# Patient Record
Sex: FEMALE | Race: WHITE | NOT HISPANIC OR LATINO | Employment: FULL TIME | URBAN - METROPOLITAN AREA
[De-identification: names, ages, dates, MRNs, and addresses within clinical notes are randomized per-mention and may not be internally consistent; named-entity substitution may affect disease eponyms.]

---
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
## 2019-02-13 RX ORDER — ALLOPURINOL 100 MG/1
100 TABLET ORAL DAILY
Qty: 30 TABLET | Refills: 0 | Status: SHIPPED | OUTPATIENT
Start: 2019-02-13 | End: 2019-08-06 | Stop reason: SDUPTHER

## 2019-04-04 RX ORDER — ERGOCALCIFEROL 1.25 MG/1
CAPSULE ORAL
Qty: 24 CAPSULE | Refills: 0 | Status: SHIPPED | OUTPATIENT
Start: 2019-04-04 | End: 2019-07-28 | Stop reason: SDUPTHER

## 2019-04-08 RX ORDER — LEVOTHYROXINE SODIUM 0.07 MG/1
75 TABLET ORAL DAILY
Qty: 30 TABLET | Refills: 0 | Status: SHIPPED | OUTPATIENT
Start: 2019-04-08 | End: 2019-08-06

## 2019-04-17 ENCOUNTER — OFFICE VISIT (OUTPATIENT)
Dept: OBSTETRICS AND GYNECOLOGY | Age: 19
End: 2019-04-17

## 2019-04-17 VITALS
SYSTOLIC BLOOD PRESSURE: 108 MMHG | WEIGHT: 258.8 LBS | BODY MASS INDEX: 44.18 KG/M2 | DIASTOLIC BLOOD PRESSURE: 70 MMHG | HEIGHT: 64 IN

## 2019-04-17 DIAGNOSIS — B37.31 CANDIDA VAGINITIS: ICD-10-CM

## 2019-04-17 DIAGNOSIS — Z13.9 SPECIAL SCREENING: Primary | ICD-10-CM

## 2019-04-17 PROCEDURE — 99213 OFFICE O/P EST LOW 20 MIN: CPT | Performed by: NURSE PRACTITIONER

## 2019-04-17 PROCEDURE — 81025 URINE PREGNANCY TEST: CPT | Performed by: NURSE PRACTITIONER

## 2019-04-17 RX ORDER — FLUCONAZOLE 150 MG/1
150 TABLET ORAL ONCE
Qty: 2 TABLET | Refills: 0 | Status: SHIPPED | OUTPATIENT
Start: 2019-04-17 | End: 2019-04-17

## 2019-04-17 RX ORDER — NAPROXEN 500 MG/1
TABLET ORAL
Refills: 0 | COMMUNITY
Start: 2019-01-09 | End: 2021-05-17

## 2019-04-17 RX ORDER — DOXYCYCLINE 100 MG/1
CAPSULE ORAL
Refills: 0 | COMMUNITY
Start: 2019-04-06 | End: 2020-02-07

## 2019-04-17 NOTE — PROGRESS NOTES
[facility]  Routine Annual Visit    2019    Patient: Flora Solis          MR#:[id number]      History of Present Illness    19 y.o. female  who presents with complaint of vaginal itching and discharge since starting an antibiotic for ear infection. Flora reports she has one more day of the antibiotic therapy. She denies new partners or exposure to STI. Getting  in July. Weight loss noted end of last year and pt commended- weight steady for the past 3 months- encouraged continued weight loss.     Patient's last menstrual period was 2019 (approximate).  Obstetric History:  OB History      Para Term  AB Living    1       1      SAB TAB Ectopic Molar Multiple Live Births                        Menstrual History:     Patient's last menstrual period was 2019 (approximate).       Sexual History:       ________________________________________  Patient Active Problem List   Diagnosis   • Irregular menses   • Vitamin D deficiency   • Morbid obesity (CMS/HCC)   • Acanthosis nigricans   • PCOS (polycystic ovarian syndrome)   • Hyperprolactinemia (CMS/HCC)   • Hyperuricemia   • Acquired hypothyroidism   • Abdominal cramping   • History of miscarriage   • Missed period   • Prediabetes       Past Medical History:   Diagnosis Date   • Hypothyroidism    • Migraine    • Polycystic ovary syndrome    • Vitamin D deficiency        Past Surgical History:   Procedure Laterality Date   • INNER EAR SURGERY     • WISDOM TOOTH EXTRACTION         Social History     Tobacco Use   Smoking Status Never Smoker   Smokeless Tobacco Never Used       Family History   Problem Relation Age of Onset   • Asthma Father    • Hypertension Mother    • Diabetes Mother    • Hypertension Maternal Grandmother    • Hypertension Maternal Grandfather        Prior to Admission medications    Medication Sig Start Date End Date Taking? Authorizing Provider   allopurinol (ZYLOPRIM) 100 MG tablet TAKE 1  "TABLET BY MOUTH DAILY 2/13/19 2/13/20 Yes Nancy Urena APRN   desogestrel-ethinyl estradiol (DESOGEN) 0.15-30 MG-MCG per tablet Take 1 tablet by mouth Daily. 9/20/18 9/20/19 Yes Radha Carrera APRN   doxycycline (MONODOX) 100 MG capsule TK 1 C PO BID FOR 10 DAYS 4/6/19  Yes Nay Botello MD   fexofenadine (ALLEGRA) 180 MG tablet Take 180 mg by mouth. 9/14/18  Yes ProviderNay MD   levothyroxine (SYNTHROID, LEVOTHROID) 75 MCG tablet TAKE 1 TABLET BY MOUTH DAILY 4/8/19 4/7/20 Yes Nancy Urena APRN   metFORMIN (GLUCOPHAGE) 500 MG tablet TAKE 1 TABLET BY MOUTH TWICE DAILY WITH MEALS 3/7/19  Yes Nancy Urena APRN   naproxen (NAPROSYN) 500 MG tablet TK 1 T PO BID PRF PAIN 1/9/19  Yes ProviderNay MD   RaNITidine HCl (ZANTAC PO) Take 1 tablet by mouth 2 (Two) Times a Day.   Yes Provider, MD Nay   vitamin D (ERGOCALCIFEROL) 51928 units capsule capsule TAKE 1 CAPSULE BY MOUTH 2 TIMES EVERY WEEK 4/4/19  Yes Nancy Urena APRN   allopurinol (ZYLOPRIM) 100 MG tablet Take 1 tablet by mouth Daily. 12/27/18 12/27/19  Nancy Urena APRN     ________________________________________    The following portions of the patient's history were reviewed and updated as appropriate: allergies, current medications, past family history, past medical history, past social history, past surgical history and problem list.    Review of Systems   Constitutional: Negative for chills, fatigue and fever.   Gastrointestinal: Negative for abdominal distention and abdominal pain.   Genitourinary: Positive for vaginal discharge. Negative for decreased urine volume, difficulty urinating, dyspareunia, dysuria, enuresis, flank pain, frequency, genital sores, hematuria, menstrual problem, pelvic pain, urgency, vaginal bleeding and vaginal pain.       Objective   Physical Exam    /70   Ht 162.6 cm (64\")   Wt 117 kg (258 lb 12.8 oz)   LMP 04/09/2019 (Approximate)   Breastfeeding? No   BMI 44.42 kg/m²  " "  BP Readings from Last 3 Encounters:   04/17/19 108/70   12/27/18 126/80   10/29/18 116/64      Wt Readings from Last 3 Encounters:   04/17/19 117 kg (258 lb 12.8 oz)   12/27/18 117 kg (259 lb) (>99 %, Z= 2.59)*   10/29/18 123 kg (272 lb) (>99 %, Z= 2.67)*     * Growth percentiles are based on [facility] (Girls, 2-20 Years) data.        BMI: Estimated body mass index is 44.42 kg/m² as calculated from the following:    Height as of this encounter: 162.6 cm (64\").    Weight as of this encounter: 117 kg (258 lb 12.8 oz).         General:   alert, appears stated age, cooperative and no distress                   Vulva: normal   Vagina: normal mucosa, curdlike discharge   Cervix: no cervical motion tenderness and no lesions   Uterus: normal size, mobile, non-tender or normal shape and consistency   Adnexa: exam limited by habitus     Assessment:    1. Candida - exam and history consistent with yeast- diflucan sent to pharm while awaiting culture    Plan:    []  Rx:   []  Mammogram request made  []  PAP done  []  Occult fecal blood test ([facility])  []  Labs:   []  GC/Chl/TV  []  DEXA scan   []  Referral for colonoscopy:           ANDREW Haas  4/17/2019 3:23 PM  "

## 2019-04-21 LAB
A VAGINAE DNA VAG QL NAA+PROBE: NORMAL SCORE
BVAB2 DNA VAG QL NAA+PROBE: NORMAL SCORE
C ALBICANS DNA VAG QL NAA+PROBE: NEGATIVE
C GLABRATA DNA VAG QL NAA+PROBE: NEGATIVE
C TRACH RRNA SPEC QL NAA+PROBE: NEGATIVE
MEGA1 DNA VAG QL NAA+PROBE: NORMAL SCORE
N GONORRHOEA RRNA SPEC QL NAA+PROBE: NEGATIVE
T VAGINALIS RRNA SPEC QL NAA+PROBE: NEGATIVE

## 2019-04-22 ENCOUNTER — TELEPHONE (OUTPATIENT)
Dept: OBSTETRICS AND GYNECOLOGY | Age: 19
End: 2019-04-22

## 2019-04-22 NOTE — TELEPHONE ENCOUNTER
----- Message from ANDREW Pierce sent at 4/22/2019  8:38 AM EDT -----  Please inform patient her swab returned totally negative for infection

## 2019-07-22 DIAGNOSIS — E79.0 HYPERURICEMIA: ICD-10-CM

## 2019-07-22 DIAGNOSIS — R73.03 PREDIABETES: ICD-10-CM

## 2019-07-22 DIAGNOSIS — E03.9 ACQUIRED HYPOTHYROIDISM: ICD-10-CM

## 2019-07-22 DIAGNOSIS — E22.1 HYPERPROLACTINEMIA (HCC): ICD-10-CM

## 2019-07-22 DIAGNOSIS — E28.2 PCOS (POLYCYSTIC OVARIAN SYNDROME): ICD-10-CM

## 2019-07-22 DIAGNOSIS — E55.9 VITAMIN D DEFICIENCY: Primary | ICD-10-CM

## 2019-07-23 ENCOUNTER — LAB (OUTPATIENT)
Dept: ENDOCRINOLOGY | Age: 19
End: 2019-07-23

## 2019-07-23 DIAGNOSIS — E79.0 HYPERURICEMIA: ICD-10-CM

## 2019-07-23 DIAGNOSIS — E55.9 VITAMIN D DEFICIENCY: ICD-10-CM

## 2019-07-23 DIAGNOSIS — E03.9 ACQUIRED HYPOTHYROIDISM: ICD-10-CM

## 2019-07-23 DIAGNOSIS — E28.2 PCOS (POLYCYSTIC OVARIAN SYNDROME): ICD-10-CM

## 2019-07-23 DIAGNOSIS — E22.1 HYPERPROLACTINEMIA (HCC): ICD-10-CM

## 2019-07-23 DIAGNOSIS — R73.03 PREDIABETES: ICD-10-CM

## 2019-07-29 LAB
25(OH)D3+25(OH)D2 SERPL-MCNC: 48.9 NG/ML (ref 30–100)
ALBUMIN SERPL-MCNC: 4.3 G/DL (ref 3.5–5.2)
ALBUMIN/GLOB SERPL: 1.9 G/DL
ALP SERPL-CCNC: 58 U/L (ref 39–117)
ALT SERPL-CCNC: 10 U/L (ref 1–33)
AST SERPL-CCNC: 9 U/L (ref 1–32)
BILIRUB SERPL-MCNC: <0.2 MG/DL (ref 0.2–1.2)
BUN SERPL-MCNC: 12 MG/DL (ref 6–20)
BUN/CREAT SERPL: 18.5 (ref 7–25)
C PEPTIDE SERPL-MCNC: 6.2 NG/ML (ref 1.1–4.4)
CALCIUM SERPL-MCNC: 9.5 MG/DL (ref 8.6–10.5)
CHLORIDE SERPL-SCNC: 104 MMOL/L (ref 98–107)
CHOLEST SERPL-MCNC: 176 MG/DL (ref 0–200)
CO2 SERPL-SCNC: 27.5 MMOL/L (ref 22–29)
CREAT SERPL-MCNC: 0.65 MG/DL (ref 0.57–1)
GLOBULIN SER CALC-MCNC: 2.3 GM/DL
GLUCOSE SERPL-MCNC: 88 MG/DL (ref 65–99)
HBA1C MFR BLD: 5.4 % (ref 4.8–5.6)
HDLC SERPL-MCNC: 42 MG/DL (ref 40–60)
INTERPRETATION: NORMAL
LDLC SERPL CALC-MCNC: 105 MG/DL (ref 0–100)
Lab: NORMAL
POTASSIUM SERPL-SCNC: 4.7 MMOL/L (ref 3.5–5.2)
PROLACTIN SERPL-MCNC: 13.9 NG/ML (ref 4.8–23.3)
PROT SERPL-MCNC: 6.6 G/DL (ref 6–8.5)
SODIUM SERPL-SCNC: 143 MMOL/L (ref 136–145)
T3FREE SERPL-MCNC: 3.1 PG/ML (ref 2–4.4)
T4 FREE SERPL-MCNC: 1.29 NG/DL (ref 0.93–1.7)
T4 SERPL-MCNC: 8.32 MCG/DL (ref 4.5–11.7)
THYROGLOB AB SERPL-ACNC: 1.3 IU/ML
THYROGLOB SERPL-MCNC: 6.2 NG/ML
THYROGLOB SERPL-MCNC: ABNORMAL NG/ML
TRIGL SERPL-MCNC: 147 MG/DL (ref 0–150)
TSH SERPL DL<=0.005 MIU/L-ACNC: 3.28 MIU/ML (ref 0.27–4.2)
URATE SERPL-MCNC: 5.3 MG/DL (ref 2.4–5.7)
VLDLC SERPL CALC-MCNC: 29.4 MG/DL

## 2019-07-29 RX ORDER — ERGOCALCIFEROL 1.25 MG/1
CAPSULE ORAL
Qty: 24 CAPSULE | Refills: 0 | Status: SHIPPED | OUTPATIENT
Start: 2019-07-29 | End: 2019-08-06 | Stop reason: SDUPTHER

## 2019-08-06 ENCOUNTER — OFFICE VISIT (OUTPATIENT)
Dept: ENDOCRINOLOGY | Age: 19
End: 2019-08-06

## 2019-08-06 VITALS
RESPIRATION RATE: 16 BRPM | WEIGHT: 260 LBS | DIASTOLIC BLOOD PRESSURE: 72 MMHG | BODY MASS INDEX: 44.39 KG/M2 | HEIGHT: 64 IN | SYSTOLIC BLOOD PRESSURE: 118 MMHG

## 2019-08-06 DIAGNOSIS — E55.9 VITAMIN D DEFICIENCY: ICD-10-CM

## 2019-08-06 DIAGNOSIS — E66.01 MORBID OBESITY (HCC): ICD-10-CM

## 2019-08-06 DIAGNOSIS — R73.03 PREDIABETES: ICD-10-CM

## 2019-08-06 DIAGNOSIS — E22.1 HYPERPROLACTINEMIA (HCC): ICD-10-CM

## 2019-08-06 DIAGNOSIS — L83 ACANTHOSIS NIGRICANS: ICD-10-CM

## 2019-08-06 DIAGNOSIS — E79.0 HYPERURICEMIA: ICD-10-CM

## 2019-08-06 DIAGNOSIS — E03.9 ACQUIRED HYPOTHYROIDISM: Primary | ICD-10-CM

## 2019-08-06 PROCEDURE — 99214 OFFICE O/P EST MOD 30 MIN: CPT | Performed by: INTERNAL MEDICINE

## 2019-08-06 RX ORDER — ERGOCALCIFEROL 1.25 MG/1
CAPSULE ORAL
Qty: 26 CAPSULE | Refills: 3 | Status: SHIPPED | OUTPATIENT
Start: 2019-08-06 | End: 2020-08-10

## 2019-08-06 RX ORDER — METFORMIN HYDROCHLORIDE 750 MG/1
750 TABLET, EXTENDED RELEASE ORAL
Qty: 180 TABLET | Refills: 3 | Status: SHIPPED | OUTPATIENT
Start: 2019-08-06 | End: 2020-02-07 | Stop reason: SDUPTHER

## 2019-08-06 RX ORDER — ALLOPURINOL 100 MG/1
100 TABLET ORAL DAILY
Qty: 90 TABLET | Refills: 3 | Status: SHIPPED | OUTPATIENT
Start: 2019-08-06 | End: 2020-08-10

## 2019-08-06 RX ORDER — LEVOTHYROXINE SODIUM 100 UG/1
100 TABLET ORAL DAILY
Qty: 30 TABLET | Refills: 11 | Status: SHIPPED | OUTPATIENT
Start: 2019-08-06 | End: 2019-08-15

## 2019-08-06 NOTE — PROGRESS NOTES
"Subjective   Flora Solis is a 19 y.o. female seen for follow up for hypothyroidism, PCOS, vit d deficiency, lab review. Patient is now being seen by an ENT and getting allergy shots.    History of Present Illness this is a 19-year-old female known patient with hypothyroidism as well as PCOS and morbid obesity and acanthosis nigricans with vitamin D deficiency and hyperuricemia.  Over the course of last 6 months she has had no significant health problem for which to go to the ER or hospital.  /72   Resp 16   Ht 162.6 cm (64\")   Wt 118 kg (260 lb)   BMI 44.63 kg/m²   Allergies   Allergen Reactions   • Cephalexin Hives         Current Outpatient Medications:   •  allopurinol (ZYLOPRIM) 100 MG tablet, TAKE 1 TABLET BY MOUTH DAILY, Disp: 30 tablet, Rfl: 0  •  desogestrel-ethinyl estradiol (DESOGEN) 0.15-30 MG-MCG per tablet, Take 1 tablet by mouth Daily., Disp: 90 tablet, Rfl: 3  •  doxycycline (MONODOX) 100 MG capsule, TK 1 C PO BID FOR 10 DAYS, Disp: , Rfl: 0  •  fexofenadine (ALLEGRA) 180 MG tablet, Take 180 mg by mouth., Disp: , Rfl:   •  levothyroxine (SYNTHROID, LEVOTHROID) 75 MCG tablet, TAKE 1 TABLET BY MOUTH DAILY, Disp: 30 tablet, Rfl: 0  •  metFORMIN (GLUCOPHAGE) 500 MG tablet, TAKE 1 TABLET BY MOUTH TWICE DAILY WITH MEALS, Disp: 60 tablet, Rfl: 0  •  naproxen (NAPROSYN) 500 MG tablet, TK 1 T PO BID PRF PAIN, Disp: , Rfl: 0  •  RaNITidine HCl (ZANTAC PO), Take 1 tablet by mouth 2 (Two) Times a Day., Disp: , Rfl:   •  vitamin D (ERGOCALCIFEROL) 98745 units capsule capsule, TAKE 1 CAPSULE BY MOUTH 2 TIMES EVERY WEEK, Disp: 24 capsule, Rfl: 0      The following portions of the patient's history were reviewed and updated as appropriate: allergies, current medications, past family history, past medical history, past social history, past surgical history and problem list.    Review of Systems   Constitutional: Negative.    HENT: Negative.    Eyes: Negative.    Respiratory: Negative.    Cardiovascular: " Negative.    Gastrointestinal: Negative.    Endocrine: Negative.    Genitourinary: Negative.    Musculoskeletal: Negative.    Skin: Negative.    Allergic/Immunologic: Negative.    Neurological: Negative.    Hematological: Negative.    Psychiatric/Behavioral: Negative.        Objective   Physical Exam   Constitutional: She is oriented to person, place, and time. She appears well-developed and well-nourished. No distress.   Morbid obesity obese   HENT:   Head: Normocephalic and atraumatic.   Right Ear: External ear normal.   Left Ear: External ear normal.   Nose: Nose normal.   Mouth/Throat: Oropharynx is clear and moist. No oropharyngeal exudate.   Eyes: Conjunctivae and EOM are normal. Pupils are equal, round, and reactive to light. Right eye exhibits no discharge. Left eye exhibits no discharge. No scleral icterus.   Neck: Normal range of motion. Neck supple. No JVD present. No tracheal deviation present. No thyromegaly present.   Cardiovascular: Normal rate, regular rhythm, normal heart sounds and intact distal pulses. Exam reveals no gallop and no friction rub.   No murmur heard.  Pulmonary/Chest: Effort normal and breath sounds normal. No stridor. No respiratory distress. She has no wheezes. She has no rales. She exhibits no tenderness.   Abdominal: Soft. Bowel sounds are normal. She exhibits no distension and no mass. There is no tenderness. There is no rebound and no guarding. No hernia.   Musculoskeletal: Normal range of motion. She exhibits no edema, tenderness or deformity.   Lymphadenopathy:     She has no cervical adenopathy.   Neurological: She is alert and oriented to person, place, and time. She has normal reflexes. She displays normal reflexes. No cranial nerve deficit or sensory deficit. She exhibits normal muscle tone. Coordination normal.   Skin: Skin is warm and dry. No rash noted. She is not diaphoretic. No erythema. No pallor.   Acanthosis nigricans around her neck as well as axillary area and  elbows and knuckles on both hands.  Also purplish stretch marks on the abdominal wall.   Psychiatric: She has a normal mood and affect. Her behavior is normal. Judgment and thought content normal.   Nursing note and vitals reviewed.       Results for orders placed or performed in visit on 07/23/19   Prolactin   Result Value Ref Range    Prolactin 13.9 4.8 - 23.3 ng/mL   Vitamin D 25 Hydroxy   Result Value Ref Range    25 Hydroxy, Vitamin D 48.9 30.0 - 100.0 ng/ml   Uric Acid   Result Value Ref Range    Uric Acid 5.3 2.4 - 5.7 mg/dL   Lipid Panel   Result Value Ref Range    Total Cholesterol 176 0 - 200 mg/dL    Triglycerides 147 0 - 150 mg/dL    HDL Cholesterol 42 40 - 60 mg/dL    VLDL Cholesterol 29.4 mg/dL    LDL Cholesterol  105 (H) 0 - 100 mg/dL   Hemoglobin A1c   Result Value Ref Range    Hemoglobin A1C 5.40 4.80 - 5.60 %   C-Peptide   Result Value Ref Range    C-Peptide 6.2 (H) 1.1 - 4.4 ng/mL   T4, Free   Result Value Ref Range    Free T4 1.29 0.93 - 1.70 ng/dL   T4 & TSH ([facility])   Result Value Ref Range    TSH 3.280 0.270 - 4.200 mIU/mL    T4, Total 8.32 4.50 - 11.70 mcg/dL   T3, Free   Result Value Ref Range    T3, Free 3.1 2.0 - 4.4 pg/mL   Comprehensive Thyroglobulin   Result Value Ref Range    Thyroglobulin Ab 1.3 (H) IU/mL    Thyroglobulin Comment ng/mL    Thyroglobulin (TG-MAYRA) 6.2 ng/mL   Comprehensive Metabolic Panel   Result Value Ref Range    Glucose 88 65 - 99 mg/dL    BUN 12 6 - 20 mg/dL    Creatinine 0.65 0.57 - 1.00 mg/dL    eGFR Non African Am 116 >60 mL/min/1.73    eGFR African Am 141 >60 mL/min/1.73    BUN/Creatinine Ratio 18.5 7.0 - 25.0    Sodium 143 136 - 145 mmol/L    Potassium 4.7 3.5 - 5.2 mmol/L    Chloride 104 98 - 107 mmol/L    Total CO2 27.5 22.0 - 29.0 mmol/L    Calcium 9.5 8.6 - 10.5 mg/dL    Total Protein 6.6 6.0 - 8.5 g/dL    Albumin 4.30 3.50 - 5.20 g/dL    Globulin 2.3 gm/dL    A/G Ratio 1.9 g/dL    Total Bilirubin <0.2 (L) 0.2 - 1.2 mg/dL    Alkaline Phosphatase 58 39 -  117 U/L    AST (SGOT) 9 1 - 32 U/L    ALT (SGPT) 10 1 - 33 U/L   Cardiovascular Risk Assessment   Result Value Ref Range    Interpretation Note    Diabetes Patient Education   Result Value Ref Range    PDF Image Not applicable          Assessment/Plan   Diagnoses and all orders for this visit:    Acquired hypothyroidism  -     T4 & TSH ([facility]); Future  -     T3, Free; Future  -     T4, Free; Future  -     Thyroglobulin With Anti-TG; Future  -     Uric Acid; Future  -     Vitamin D 25 Hydroxy; Future  -     Comprehensive Metabolic Panel; Future  -     C-Peptide; Future  -     Hemoglobin A1c; Future  -     Lipid Panel; Future  -     MicroAlbumin, Urine, Random - Urine, Clean Catch; Future  -     Prolactin; Future  -     Luteinizing Hormone; Future  -     Follicle Stimulating Hormone; Future    Hyperprolactinemia (CMS/HCC)  -     T4 & TSH ([facility]); Future  -     T3, Free; Future  -     T4, Free; Future  -     Thyroglobulin With Anti-TG; Future  -     Uric Acid; Future  -     Vitamin D 25 Hydroxy; Future  -     Comprehensive Metabolic Panel; Future  -     C-Peptide; Future  -     Hemoglobin A1c; Future  -     Lipid Panel; Future  -     MicroAlbumin, Urine, Random - Urine, Clean Catch; Future  -     Prolactin; Future  -     Luteinizing Hormone; Future  -     Follicle Stimulating Hormone; Future    Vitamin D deficiency  -     T4 & TSH ([facility]); Future  -     T3, Free; Future  -     T4, Free; Future  -     Thyroglobulin With Anti-TG; Future  -     Uric Acid; Future  -     Vitamin D 25 Hydroxy; Future  -     Comprehensive Metabolic Panel; Future  -     C-Peptide; Future  -     Hemoglobin A1c; Future  -     Lipid Panel; Future  -     MicroAlbumin, Urine, Random - Urine, Clean Catch; Future  -     Prolactin; Future  -     Luteinizing Hormone; Future  -     Follicle Stimulating Hormone; Future    Morbid obesity (CMS/HCC)  -     T4 & TSH ([facility]); Future  -     T3, Free; Future  -     T4, Free; Future  -     Thyroglobulin  With Anti-TG; Future  -     Uric Acid; Future  -     Vitamin D 25 Hydroxy; Future  -     Comprehensive Metabolic Panel; Future  -     C-Peptide; Future  -     Hemoglobin A1c; Future  -     Lipid Panel; Future  -     MicroAlbumin, Urine, Random - Urine, Clean Catch; Future  -     Prolactin; Future  -     Luteinizing Hormone; Future  -     Follicle Stimulating Hormone; Future    Acanthosis nigricans  -     T4 & TSH ([facility]); Future  -     T3, Free; Future  -     T4, Free; Future  -     Thyroglobulin With Anti-TG; Future  -     Uric Acid; Future  -     Vitamin D 25 Hydroxy; Future  -     Comprehensive Metabolic Panel; Future  -     C-Peptide; Future  -     Hemoglobin A1c; Future  -     Lipid Panel; Future  -     MicroAlbumin, Urine, Random - Urine, Clean Catch; Future  -     Prolactin; Future  -     Luteinizing Hormone; Future  -     Follicle Stimulating Hormone; Future    Hyperuricemia  -     T4 & TSH ([facility]); Future  -     T3, Free; Future  -     T4, Free; Future  -     Thyroglobulin With Anti-TG; Future  -     Uric Acid; Future  -     Vitamin D 25 Hydroxy; Future  -     Comprehensive Metabolic Panel; Future  -     C-Peptide; Future  -     Hemoglobin A1c; Future  -     Lipid Panel; Future  -     MicroAlbumin, Urine, Random - Urine, Clean Catch; Future  -     Prolactin; Future  -     Luteinizing Hormone; Future  -     Follicle Stimulating Hormone; Future    Prediabetes  -     T4 & TSH ([facility]); Future  -     T3, Free; Future  -     T4, Free; Future  -     Thyroglobulin With Anti-TG; Future  -     Uric Acid; Future  -     Vitamin D 25 Hydroxy; Future  -     Comprehensive Metabolic Panel; Future  -     C-Peptide; Future  -     Hemoglobin A1c; Future  -     Lipid Panel; Future  -     MicroAlbumin, Urine, Random - Urine, Clean Catch; Future  -     Prolactin; Future  -     Luteinizing Hormone; Future  -     Follicle Stimulating Hormone; Future    Other orders  -     UNITHROID 100 MCG tablet; Take 1 tablet by mouth  Daily.  -     metFORMIN ER (GLUCOPHAGE-XR) 750 MG 24 hr tablet; Take 1 tablet by mouth Daily With Breakfast.  -     allopurinol (ZYLOPRIM) 100 MG tablet; Take 1 tablet by mouth Daily.  -     vitamin D (ERGOCALCIFEROL) 03532 units capsule capsule; 50,000 units twice weekly      In summary I saw and examined this 19-year-old female for above-mentioned problems.  I reviewed her laboratory evaluations of 7/23/2019 and provided her with a hard copy of it.  Overall she is clinically and metabolically stable however her TSH allows me to increase the dose of her thyroid hormone replacement to 100 mcg daily.

## 2019-08-14 ENCOUNTER — PRIOR AUTHORIZATION (OUTPATIENT)
Dept: ENDOCRINOLOGY | Age: 19
End: 2019-08-14

## 2019-08-15 RX ORDER — LEVOTHYROXINE SODIUM 100 UG/1
100 TABLET ORAL DAILY
Qty: 90 TABLET | Refills: 3 | Status: SHIPPED | OUTPATIENT
Start: 2019-08-15 | End: 2020-08-14

## 2019-10-28 RX ORDER — DESOGESTREL AND ETHINYL ESTRADIOL 0.15-0.03
KIT ORAL
Qty: 84 TABLET | Refills: 0 | Status: SHIPPED | OUTPATIENT
Start: 2019-10-28 | End: 2020-02-07

## 2020-01-27 ENCOUNTER — RESULTS ENCOUNTER (OUTPATIENT)
Dept: ENDOCRINOLOGY | Age: 20
End: 2020-01-27

## 2020-01-27 DIAGNOSIS — L83 ACANTHOSIS NIGRICANS: ICD-10-CM

## 2020-01-27 DIAGNOSIS — R73.03 PREDIABETES: ICD-10-CM

## 2020-01-27 DIAGNOSIS — E22.1 HYPERPROLACTINEMIA (HCC): ICD-10-CM

## 2020-01-27 DIAGNOSIS — E55.9 VITAMIN D DEFICIENCY: ICD-10-CM

## 2020-01-27 DIAGNOSIS — E66.01 MORBID OBESITY (HCC): ICD-10-CM

## 2020-01-27 DIAGNOSIS — E03.9 ACQUIRED HYPOTHYROIDISM: ICD-10-CM

## 2020-01-27 DIAGNOSIS — E79.0 HYPERURICEMIA: ICD-10-CM

## 2020-01-27 LAB
25(OH)D3+25(OH)D2 SERPL-MCNC: 45 NG/ML (ref 30–100)
ALBUMIN SERPL-MCNC: 4.2 G/DL (ref 3.5–5.2)
ALBUMIN/GLOB SERPL: 1.9 G/DL
ALP SERPL-CCNC: 56 U/L (ref 39–117)
ALT SERPL-CCNC: 10 U/L (ref 1–33)
AST SERPL-CCNC: 13 U/L (ref 1–32)
BILIRUB SERPL-MCNC: 0.2 MG/DL (ref 0.2–1.2)
BUN SERPL-MCNC: 10 MG/DL (ref 6–20)
BUN/CREAT SERPL: 14.3 (ref 7–25)
C PEPTIDE SERPL-MCNC: 7.5 NG/ML (ref 1.1–4.4)
CALCIUM SERPL-MCNC: 9.1 MG/DL (ref 8.6–10.5)
CHLORIDE SERPL-SCNC: 104 MMOL/L (ref 98–107)
CHOLEST SERPL-MCNC: 145 MG/DL (ref 0–200)
CO2 SERPL-SCNC: 22.9 MMOL/L (ref 22–29)
CREAT SERPL-MCNC: 0.7 MG/DL (ref 0.57–1)
FSH SERPL-ACNC: 4.7 MIU/ML
GLOBULIN SER CALC-MCNC: 2.2 GM/DL
GLUCOSE SERPL-MCNC: 90 MG/DL (ref 65–99)
HBA1C MFR BLD: 5.5 % (ref 4.8–5.6)
HDLC SERPL-MCNC: 36 MG/DL (ref 40–60)
INTERPRETATION: NORMAL
LDLC SERPL CALC-MCNC: 74 MG/DL (ref 0–100)
LH SERPL-ACNC: 1.7 MIU/ML
Lab: NORMAL
POTASSIUM SERPL-SCNC: 4.1 MMOL/L (ref 3.5–5.2)
PROLACTIN SERPL-MCNC: 16.2 NG/ML (ref 4.8–23.3)
PROT SERPL-MCNC: 6.4 G/DL (ref 6–8.5)
SODIUM SERPL-SCNC: 141 MMOL/L (ref 136–145)
T3FREE SERPL-MCNC: 2.8 PG/ML (ref 2–4.4)
T4 FREE SERPL-MCNC: 1.32 NG/DL (ref 0.93–1.7)
T4 SERPL-MCNC: 7.63 MCG/DL (ref 4.5–11.7)
THYROGLOB AB SERPL-ACNC: <1 IU/ML (ref 0–0.9)
THYROGLOB SERPL-MCNC: 2.4 NG/ML (ref 1.5–38.5)
TRIGL SERPL-MCNC: 173 MG/DL (ref 0–150)
TSH SERPL DL<=0.005 MIU/L-ACNC: 2.41 UIU/ML (ref 0.27–4.2)
UNABLE TO VOID: NORMAL
URATE SERPL-MCNC: 5.7 MG/DL (ref 2.4–5.7)
VLDLC SERPL CALC-MCNC: 34.6 MG/DL

## 2020-02-07 ENCOUNTER — OFFICE VISIT (OUTPATIENT)
Dept: ENDOCRINOLOGY | Age: 20
End: 2020-02-07

## 2020-02-07 VITALS
HEIGHT: 64 IN | SYSTOLIC BLOOD PRESSURE: 100 MMHG | WEIGHT: 276 LBS | DIASTOLIC BLOOD PRESSURE: 80 MMHG | BODY MASS INDEX: 47.12 KG/M2

## 2020-02-07 DIAGNOSIS — L65.9 HAIR LOSS: ICD-10-CM

## 2020-02-07 DIAGNOSIS — E22.1 HYPERPROLACTINEMIA (HCC): ICD-10-CM

## 2020-02-07 DIAGNOSIS — N92.6 IRREGULAR MENSES: ICD-10-CM

## 2020-02-07 DIAGNOSIS — E66.01 MORBID OBESITY (HCC): ICD-10-CM

## 2020-02-07 DIAGNOSIS — R73.03 PREDIABETES: ICD-10-CM

## 2020-02-07 DIAGNOSIS — E55.9 VITAMIN D DEFICIENCY: ICD-10-CM

## 2020-02-07 DIAGNOSIS — E28.2 PCOS (POLYCYSTIC OVARIAN SYNDROME): ICD-10-CM

## 2020-02-07 DIAGNOSIS — E79.0 HYPERURICEMIA: ICD-10-CM

## 2020-02-07 DIAGNOSIS — E03.9 ACQUIRED HYPOTHYROIDISM: Primary | ICD-10-CM

## 2020-02-07 DIAGNOSIS — L83 ACANTHOSIS NIGRICANS: ICD-10-CM

## 2020-02-07 PROCEDURE — 99214 OFFICE O/P EST MOD 30 MIN: CPT | Performed by: NURSE PRACTITIONER

## 2020-02-07 RX ORDER — PHENTERMINE HYDROCHLORIDE 30 MG/1
30 CAPSULE ORAL EVERY MORNING
Qty: 30 CAPSULE | Refills: 2 | Status: SHIPPED | OUTPATIENT
Start: 2020-02-07 | End: 2020-11-16

## 2020-02-07 RX ORDER — SPIRONOLACTONE 100 MG/1
100 TABLET, FILM COATED ORAL DAILY
Qty: 30 TABLET | Refills: 5 | Status: SHIPPED | OUTPATIENT
Start: 2020-02-07 | End: 2020-07-10

## 2020-02-07 RX ORDER — METFORMIN HYDROCHLORIDE 1000 MG/1
1000 TABLET, FILM COATED, EXTENDED RELEASE ORAL
Qty: 30 TABLET | Refills: 5 | Status: SHIPPED | OUTPATIENT
Start: 2020-02-07 | End: 2020-11-16

## 2020-02-07 NOTE — PATIENT INSTRUCTIONS
Aldactone 100 mg 1 pill daily to lower testosterone  Phentermine 30 mg once daily for weight loss (appetite suppressant)  Increase metformin to 1000 mg once daily

## 2020-02-07 NOTE — PROGRESS NOTES
"Subjective   Flora Solis is a 19 y.o. female is here today for follow-up.  Chief Complaint   Patient presents with   • Vitamin D Deficiency     labs rec.  Bg 0 x daily   • Hypothyroidism   • Prediabetes     /80 (BP Location: Left arm, Patient Position: Sitting, Cuff Size: Adult)   Ht 162.6 cm (64.02\")   Wt 125 kg (276 lb)   BMI 47.35 kg/m²   Current Outpatient Medications on File Prior to Visit   Medication Sig   • allopurinol (ZYLOPRIM) 100 MG tablet Take 1 tablet by mouth Daily.   • fexofenadine (ALLEGRA) 180 MG tablet Take 180 mg by mouth.   • LEVOXYL 100 MCG tablet Take 1 tablet by mouth Daily.   • metFORMIN ER (GLUCOPHAGE-XR) 750 MG 24 hr tablet Take 1 tablet by mouth Daily With Breakfast.   • RaNITidine HCl (ZANTAC PO) Take 1 tablet by mouth 2 (Two) Times a Day.   • vitamin D (ERGOCALCIFEROL) 87763 units capsule capsule 50,000 units twice weekly   • doxycycline (MONODOX) 100 MG capsule TK 1 C PO BID FOR 10 DAYS   • ISIBLOOM 0.15-30 MG-MCG per tablet TAKE 1 TABLET BY MOUTH EVERY DAY   • metFORMIN (GLUCOPHAGE) 500 MG tablet TAKE 1 TABLET BY MOUTH TWICE DAILY WITH MEALS   • naproxen (NAPROSYN) 500 MG tablet TK 1 T PO BID PRF PAIN     No current facility-administered medications on file prior to visit.      Family History   Problem Relation Age of Onset   • Asthma Father    • Hypertension Mother    • Diabetes Mother    • Hypertension Maternal Grandmother    • Hypertension Maternal Grandfather      Social History     Tobacco Use   • Smoking status: Never Smoker   • Smokeless tobacco: Never Used   Substance Use Topics   • Alcohol use: Yes     Comment: occas   • Drug use: No     Allergies   Allergen Reactions   • Cephalexin Hives         History of Present Illness   Encounter Diagnoses   Name Primary?   • Acquired hypothyroidism Yes   • Acanthosis nigricans    • Hyperprolactinemia (CMS/HCC)    • Hyperuricemia    • Irregular menses    • PCOS (polycystic ovarian syndrome)    • Prediabetes    • Vitamin D " deficiency    • Morbid obesity (CMS/[facility])      19-year-old female patient here today for follow-up visit.  She is being seen for the above-mentioned problems.  She has a history of high testosterone levels.  She has PCOS, acne, hirsutism, and is complaining of hair loss at today's visit.  She wants to try Aldactone to see if this helps with her hair loss.  Prescription was sent to her pharmacy.  She is gaining weight and has not been exercising due to a shoulder injury.  Prior to that she was doing kickboxing.  We discussed phentermine and a Tobi was requested and reviewed.  She was given a written prescription for phentermine to try.  She is currently on thyroid hormone is taking that daily consistently.  We discussed increasing her metformin to 1000 milligrams extended release once daily.  Her periods are irregular and she was on birth control pills.  She is no longer taking them.  Her medication list was reviewed and updated.    The following portions of the patient's history were reviewed and updated as appropriate: allergies, current medications, past family history, past medical history, past social history, past surgical history and problem list.    Review of Systems   Constitutional: Positive for fatigue. Negative for appetite change.   Eyes: Negative for visual disturbance.   Respiratory: Negative for cough.    Cardiovascular: Negative for leg swelling.   Gastrointestinal: Negative for constipation and diarrhea.   Endocrine: Negative for cold intolerance, heat intolerance, polydipsia, polyphagia and polyuria.   Genitourinary: Negative for frequency.   Neurological: Negative for numbness.       Objective   Physical Exam   Constitutional: She is oriented to person, place, and time. She appears well-developed and well-nourished. No distress.   Morbid obesity obese   HENT:   Head: Normocephalic and atraumatic.   Right Ear: External ear normal.   Left Ear: External ear normal.   Nose: Nose normal.    Mouth/Throat: Oropharynx is clear and moist. No oropharyngeal exudate.   Eyes: Pupils are equal, round, and reactive to light. Conjunctivae and EOM are normal. Right eye exhibits no discharge. Left eye exhibits no discharge.   Neck: Normal range of motion. Neck supple.   Acanthosis nigra cans   Cardiovascular: Normal rate, regular rhythm, normal heart sounds and intact distal pulses. Exam reveals no gallop and no friction rub.   No murmur heard.  Pulmonary/Chest: Effort normal and breath sounds normal. No respiratory distress. She has no wheezes. She has no rales. She exhibits no tenderness.   Abdominal: Soft. Bowel sounds are normal. She exhibits no distension and no mass. There is no tenderness. There is no rebound and no guarding. No hernia.   Musculoskeletal: Normal range of motion. She exhibits no edema, tenderness or deformity.   Neurological: She is alert and oriented to person, place, and time. She has normal reflexes. She displays normal reflexes. No cranial nerve deficit. She exhibits normal muscle tone. Coordination normal.   Skin: Skin is warm and dry. No rash noted. She is not diaphoretic. No erythema. No pallor.   Acanthosis nigricans around her neck as well as axillary area and elbows and knuckles on both hands.  Also purplish stretch marks on the abdominal wall.  Acne  Hair loss  Hirsutism   Psychiatric: She has a normal mood and affect. Her behavior is normal. Judgment and thought content normal.   Nursing note and vitals reviewed.    Results for orders placed or performed in visit on 01/27/20   T4 & TSH ([facility])   Result Value Ref Range    TSH 2.410 0.270 - 4.200 uIU/mL    T4, Total 7.63 4.50 - 11.70 mcg/dL   T3, Free   Result Value Ref Range    T3, Free 2.8 2.0 - 4.4 pg/mL   T4, Free   Result Value Ref Range    Free T4 1.32 0.93 - 1.70 ng/dL   Thyroglobulin With Anti-TG   Result Value Ref Range    Thyroglobulin Ab <1.0 0.0 - 0.9 IU/mL   Uric Acid   Result Value Ref Range    Uric Acid 5.7 2.4 -  5.7 mg/dL   Vitamin D 25 Hydroxy   Result Value Ref Range    25 Hydroxy, Vitamin D 45.0 30.0 - 100.0 ng/ml   Comprehensive Metabolic Panel   Result Value Ref Range    Glucose 90 65 - 99 mg/dL    BUN 10 6 - 20 mg/dL    Creatinine 0.70 0.57 - 1.00 mg/dL    eGFR Non African Am 107 >60 mL/min/1.73    eGFR African Am 129 >60 mL/min/1.73    BUN/Creatinine Ratio 14.3 7.0 - 25.0    Sodium 141 136 - 145 mmol/L    Potassium 4.1 3.5 - 5.2 mmol/L    Chloride 104 98 - 107 mmol/L    Total CO2 22.9 22.0 - 29.0 mmol/L    Calcium 9.1 8.6 - 10.5 mg/dL    Total Protein 6.4 6.0 - 8.5 g/dL    Albumin 4.20 3.50 - 5.20 g/dL    Globulin 2.2 gm/dL    A/G Ratio 1.9 g/dL    Total Bilirubin 0.2 0.2 - 1.2 mg/dL    Alkaline Phosphatase 56 39 - 117 U/L    AST (SGOT) 13 1 - 32 U/L    ALT (SGPT) 10 1 - 33 U/L   C-Peptide   Result Value Ref Range    C-Peptide 7.5 (H) 1.1 - 4.4 ng/mL   Hemoglobin A1c   Result Value Ref Range    Hemoglobin A1C 5.50 4.80 - 5.60 %   Lipid Panel   Result Value Ref Range    Total Cholesterol 145 0 - 200 mg/dL    Triglycerides 173 (H) 0 - 150 mg/dL    HDL Cholesterol 36 (L) 40 - 60 mg/dL    VLDL Cholesterol 34.6 mg/dL    LDL Cholesterol  74 0 - 100 mg/dL   Prolactin   Result Value Ref Range    Prolactin 16.2 4.8 - 23.3 ng/mL   Luteinizing Hormone   Result Value Ref Range    LH 1.7 mIU/mL   Follicle Stimulating Hormone   Result Value Ref Range    FSH 4.7 mIU/mL   Cardiovascular Risk Assessment   Result Value Ref Range    Interpretation Note    Diabetes Patient Education   Result Value Ref Range    PDF Image Not applicable    Thyroglobulin By ZA   Result Value Ref Range    THYROGLOBULIN BY ZA 2.4 1.5 - 38.5 ng/mL   Unable To Void   Result Value Ref Range    Unable to Void Comment          Assessment/Plan   Problems Addressed this Visit        Digestive    Vitamin D deficiency    Morbid obesity (CMS/HCC)       Endocrine    PCOS (polycystic ovarian syndrome)    Hyperprolactinemia (CMS/HCC)    Hypothyroidism - Primary        Musculoskeletal and Integument    Acanthosis nigricans       Genitourinary    Irregular menses       Other    Hyperuricemia    Prediabetes      Patient was seen and examined.  Metabolically and clinically she is stable.  Her A1c reflects good glycemic control.  She will be started on phentermine 30 mg once daily to assist her with weight loss as a appetite suppressant.  She is currently working at [employer] and was educated today regarding the agonism of action of phentermine.  She is on Levoxyl and her thyroid hormones are in satisfactory range.  She will be prescribed Aldactone 100 mg once daily for history of high levels of testosterone therapy and hair loss.  Her prescriptions were sent to her pharmacy.  Her blood pressures in satisfactory range.  She has had significant weight gain since her last visit.  She is no longer on birth control pills.  She is eventually considering getting pregnant has been advised she cannot take any medicines other her thyroid medication if she is to become pregnant.

## 2020-05-04 ENCOUNTER — TELEPHONE (OUTPATIENT)
Dept: OBSTETRICS AND GYNECOLOGY | Age: 20
End: 2020-05-04

## 2020-05-05 ENCOUNTER — OFFICE VISIT (OUTPATIENT)
Dept: OBSTETRICS AND GYNECOLOGY | Age: 20
End: 2020-05-05

## 2020-05-05 VITALS
WEIGHT: 261 LBS | BODY MASS INDEX: 44.56 KG/M2 | HEIGHT: 64 IN | SYSTOLIC BLOOD PRESSURE: 124 MMHG | DIASTOLIC BLOOD PRESSURE: 64 MMHG

## 2020-05-05 DIAGNOSIS — N92.6 IRREGULAR MENSES: Primary | ICD-10-CM

## 2020-05-05 PROCEDURE — 81025 URINE PREGNANCY TEST: CPT | Performed by: NURSE PRACTITIONER

## 2020-05-05 PROCEDURE — 99213 OFFICE O/P EST LOW 20 MIN: CPT | Performed by: NURSE PRACTITIONER

## 2020-05-05 NOTE — PROGRESS NOTES
Subjective   Flora Solis is a 20 y.o. female.     History of Present Illness     Flora presents for follow up to discuss her cycles. She reports generally normal cycles up until recently she has had cycle every 2-3 week for the past 2 months. No associated pain/cramping.  She does note this irregularity started about the same time endocrinology prescribed her phentermine- wondering if there is a correlation.   She has had success on the phentermine- 15 pound weight loss noted!  Endocrinology reported labs stable at her appointment in February.     Flora was  last year. No plans on conceiving in near future as she would like to continue her weight loss. Very much encouraged this.  Working at [employer].    The following portions of the patient's history were reviewed and updated as appropriate: allergies, current medications, past family history, past medical history, past social history, past surgical history and problem list.    Review of Systems   Constitutional: Negative for chills, fatigue and fever.   Gastrointestinal: Negative for abdominal distention and abdominal pain.   Genitourinary: Positive for menstrual problem. Negative for difficulty urinating, dyspareunia, dysuria, frequency, genital sores, hematuria, pelvic pain, pelvic pressure, urgency, urinary incontinence, vaginal bleeding, vaginal discharge and vaginal pain.       Objective   Physical Exam   Constitutional: She is oriented to person, place, and time. She appears well-developed and well-nourished. No distress.   Neurological: She is alert and oriented to person, place, and time.   Skin: Skin is warm and dry.   Psychiatric: She has a normal mood and affect. Her behavior is normal.         Assessment/Plan   Flora was seen today for menstrual problem.    Diagnoses and all orders for this visit:    Irregular menses  -     POC Pregnancy, Urine      Counseled regarding possible etiologies of irregular periods. Very well could be due to her  recent weight loss and phentermine use. Flora is due for annual exam and first pap smear as now 20. I will refer her to Dr. Galloway. Appointment scheduled 4 weeks. Continue tracking cycles and discuss at upcoming appointment.    ANDREW Haas

## 2020-06-03 ENCOUNTER — OFFICE VISIT (OUTPATIENT)
Dept: OBSTETRICS AND GYNECOLOGY | Age: 20
End: 2020-06-03

## 2020-06-03 VITALS
SYSTOLIC BLOOD PRESSURE: 130 MMHG | WEIGHT: 257 LBS | BODY MASS INDEX: 43.87 KG/M2 | DIASTOLIC BLOOD PRESSURE: 80 MMHG | HEIGHT: 64 IN

## 2020-06-03 DIAGNOSIS — Z12.4 SCREENING FOR MALIGNANT NEOPLASM OF CERVIX: Primary | ICD-10-CM

## 2020-06-03 PROCEDURE — 99395 PREV VISIT EST AGE 18-39: CPT | Performed by: OBSTETRICS & GYNECOLOGY

## 2020-06-03 NOTE — PROGRESS NOTES
Routine Annual Visit    6/3/2020    Patient: Flora Solis          MR#:[id number]      Chief Complaint   Patient presents with   • Gynecologic Exam       History of Present Illness    20 y.o. female  who presents for annual exam.   Since February she has lost 15-20 taking phentermine, she has at times increased her activity but she hasn't changed her diet at all. Reports eating donuts, sausage biscuits for breakfast. Eats fast food, like [employer] and pizza.  Her  is not overweight but has a poor diet and she eats similarly.  She takes metformin, she has prediabetes.    She is interested in getting pregnant.  She has not been contraceptive for 2 years and has not yet gotten pregnant.  Her menses are irregular, she will sometimes skip a month or 2 and then have more prolonged bleeding.  She is not interested in resuming OCPs.    Health Maintenance  Gardasil yes  Last pap: none prior    Current contraception: none    Patient's last menstrual period was 05/10/2020 (exact date).  Obstetric History:  OB History        1    Para        Term                AB   1    Living           SAB        TAB        Ectopic        Molar        Multiple        Live Births                   Menstrual History:     Patient's last menstrual period was 05/10/2020 (exact date).       ________________________________________  Patient Active Problem List   Diagnosis   • Irregular menses   • Vitamin D deficiency   • Morbid obesity (CMS/HCC)   • Acanthosis nigricans   • PCOS (polycystic ovarian syndrome)   • Hyperprolactinemia (CMS/HCC)   • Hyperuricemia   • Hypothyroidism   • Abdominal cramping   • History of miscarriage   • Missed period   • Prediabetes   • Hair loss       Past Medical History:   Diagnosis Date   • Hypothyroidism    • Migraine    • Polycystic ovary syndrome    • Vitamin D deficiency        Family History   Problem Relation Age of Onset   • Asthma Father    • Hypertension Mother    • Diabetes Mother   "  • Hypertension Maternal Grandmother    • Hypertension Maternal Grandfather        Past Surgical History:   Procedure Laterality Date   • INNER EAR SURGERY     • WISDOM TOOTH EXTRACTION         Social History     Tobacco Use   Smoking Status Never Smoker   Smokeless Tobacco Never Used       has a current medication list which includes the following prescription(s): allopurinol, fexofenadine, levoxyl, naproxen, phentermine, ranitidine hcl, spironolactone, and vitamin d.  ________________________________________      The following portions of the patient's history were reviewed and updated as appropriate: allergies, current medications, past family history, past medical history, past social history, past surgical history and problem list.    Review of Systems   Constitutional: Negative for fever and unexpected weight change.   Respiratory: Negative for shortness of breath.    Cardiovascular: Negative for chest pain.   Gastrointestinal: Negative for abdominal pain, constipation and diarrhea.   Genitourinary: Negative for frequency and urgency.   Hematological: Negative for adenopathy.   Psychiatric/Behavioral: Negative for dysphoric mood.       Objective   Physical Exam    /80   Ht 162.6 cm (64\")   Wt 117 kg (257 lb)   LMP 05/10/2020 (Exact Date)   Breastfeeding No   BMI 44.11 kg/m²    BP Readings from Last 3 Encounters:   06/03/20 130/80   05/05/20 124/64   02/07/20 100/80      Wt Readings from Last 3 Encounters:   06/03/20 117 kg (257 lb)   05/05/20 118 kg (261 lb)   02/07/20 125 kg (276 lb)         BMI: Body mass index is 44.11 kg/m².       General:   alert, appears stated age and cooperative   Neck: No thyromegaly or LAD, no carotid bruit noted   Heart:: regular rate and rhythm, S1, S2 normal, no murmur, click, rub or gallop   Lungs: normal respiratory effort and auscultation   Abdomen: soft, non-tender, without masses or organomegaly and obese   Breast: inspection negative, no nipple discharge or " bleeding, no masses or nodularity palpable   Urethra and bladder: urethral meatus normal; bladder nontender to palpation;   Vulva: normal, Bartholin's, Urethra, [place]ita Cort[place]'s normal   Vagina: normal mucosa, normal discharge   Cervix: no lesions and nulliparous appearance   Uterus: normal size or anteverted   Adnexa: normal adnexa and no mass, fullness, tenderness       Assessment:    normal annual exam   Obesity  Desire for pregnancy  PCOS, anovulation    Plan:    Plan     []  Mammogram request made  [x]  PAP done  []  Labs:   []  GC/Chl/TV-declines  []  DEXA scan   []  Referral for colonoscopy:     Reviewed anovulation a/w PCOS  Health weight prior to pregnancy, extensive discussion today. Also infertility related to weight loss  Declines OCPs today  Information regarding HMR given    Counseling  [x]  Nutrition  [x]  Physical activity/regular exercise   [x]  Healthy weight  []  Injury prevention  []  Smoking cessation  []  Substance misuse/abuse  [x]  Sexual behavior  [x]  STD prevention  [x]  Contraception -declines  []  Dental health  []  Mental health  [x]  Immunization  [x]  Encouraged SBE      Patient's BMI is Body mass index is 44.11 kg/m²., which is classified as obese. We discussed health consequences of obesity and recommended weight loss. I counseled regarding diet modifications and exercise in order to reach weight loss goal.       Follow-up in 6 months on weight loss    Jana Galloway MD  06/03/2020  16:23

## 2020-06-05 LAB
CONV .: NORMAL
CYTOLOGIST CVX/VAG CYTO: NORMAL
CYTOLOGY CVX/VAG DOC CYTO: NORMAL
CYTOLOGY CVX/VAG DOC THIN PREP: NORMAL
DX ICD CODE: NORMAL
HIV 1 & 2 AB SER-IMP: NORMAL
OTHER STN SPEC: NORMAL
STAT OF ADQ CVX/VAG CYTO-IMP: NORMAL

## 2020-07-10 RX ORDER — SPIRONOLACTONE 100 MG/1
100 TABLET, FILM COATED ORAL DAILY
Qty: 30 TABLET | Refills: 2 | Status: SHIPPED | OUTPATIENT
Start: 2020-07-10 | End: 2020-12-30

## 2020-08-02 ENCOUNTER — APPOINTMENT (OUTPATIENT)
Dept: CT IMAGING | Facility: HOSPITAL | Age: 20
End: 2020-08-02

## 2020-08-02 ENCOUNTER — HOSPITAL ENCOUNTER (EMERGENCY)
Facility: HOSPITAL | Age: 20
Discharge: HOME | End: 2020-08-02
Attending: EMERGENCY MEDICINE | Admitting: EMERGENCY MEDICINE

## 2020-08-02 VITALS
HEART RATE: 86 BPM | RESPIRATION RATE: 17 BRPM | WEIGHT: 265 LBS | BODY MASS INDEX: 44.15 KG/M2 | DIASTOLIC BLOOD PRESSURE: 75 MMHG | HEIGHT: 65 IN | SYSTOLIC BLOOD PRESSURE: 105 MMHG | OXYGEN SATURATION: 97 % | TEMPERATURE: 96.7 F

## 2020-08-02 DIAGNOSIS — N83.201 CYST OF RIGHT OVARY: ICD-10-CM

## 2020-08-02 DIAGNOSIS — N12 PYELONEPHRITIS: Primary | ICD-10-CM

## 2020-08-02 DIAGNOSIS — R31.9 HEMATURIA, UNSPECIFIED TYPE: ICD-10-CM

## 2020-08-02 LAB
ALBUMIN SERPL-MCNC: 3.9 G/DL (ref 3.5–5.2)
ALBUMIN/GLOB SERPL: 1.4 G/DL
ALP SERPL-CCNC: 50 U/L (ref 39–117)
ALT SERPL W P-5'-P-CCNC: 15 U/L (ref 1–33)
ANION GAP SERPL CALCULATED.3IONS-SCNC: 10.2 MMOL/L (ref 5–15)
AST SERPL-CCNC: 11 U/L (ref 1–32)
B-HCG UR QL: NEGATIVE
BACTERIA UR QL AUTO: ABNORMAL /HPF
BASOPHILS # BLD AUTO: 0.06 10*3/MM3 (ref 0–0.2)
BASOPHILS NFR BLD AUTO: 0.4 % (ref 0–1.5)
BILIRUB SERPL-MCNC: 0.2 MG/DL (ref 0–1.2)
BILIRUB UR QL STRIP: NEGATIVE
BUN SERPL-MCNC: 12 MG/DL (ref 6–20)
BUN/CREAT SERPL: 15.6 (ref 7–25)
CALCIUM SPEC-SCNC: 9.1 MG/DL (ref 8.6–10.5)
CHLORIDE SERPL-SCNC: 103 MMOL/L (ref 98–107)
CLARITY UR: ABNORMAL
CO2 SERPL-SCNC: 22.8 MMOL/L (ref 22–29)
COLOR UR: YELLOW
CREAT SERPL-MCNC: 0.77 MG/DL (ref 0.57–1)
D-LACTATE SERPL-SCNC: 0.8 MMOL/L (ref 0.5–2)
DEPRECATED RDW RBC AUTO: 42.8 FL (ref 37–54)
EOSINOPHIL # BLD AUTO: 0.14 10*3/MM3 (ref 0–0.4)
EOSINOPHIL NFR BLD AUTO: 1 % (ref 0.3–6.2)
ERYTHROCYTE [DISTWIDTH] IN BLOOD BY AUTOMATED COUNT: 13.7 % (ref 12.3–15.4)
GFR SERPL CREATININE-BSD FRML MDRD: 95 ML/MIN/1.73
GLOBULIN UR ELPH-MCNC: 2.8 GM/DL
GLUCOSE SERPL-MCNC: 81 MG/DL (ref 65–99)
GLUCOSE UR STRIP-MCNC: NEGATIVE MG/DL
HCT VFR BLD AUTO: 37.6 % (ref 34–46.6)
HGB BLD-MCNC: 11.9 G/DL (ref 12–15.9)
HGB UR QL STRIP.AUTO: ABNORMAL
HYALINE CASTS UR QL AUTO: ABNORMAL /LPF
IMM GRANULOCYTES # BLD AUTO: 0.05 10*3/MM3 (ref 0–0.05)
IMM GRANULOCYTES NFR BLD AUTO: 0.3 % (ref 0–0.5)
KETONES UR QL STRIP: ABNORMAL
LEUKOCYTE ESTERASE UR QL STRIP.AUTO: ABNORMAL
LYMPHOCYTES # BLD AUTO: 2.05 10*3/MM3 (ref 0.7–3.1)
LYMPHOCYTES NFR BLD AUTO: 14 % (ref 19.6–45.3)
MCH RBC QN AUTO: 26.5 PG (ref 26.6–33)
MCHC RBC AUTO-ENTMCNC: 31.6 G/DL (ref 31.5–35.7)
MCV RBC AUTO: 83.7 FL (ref 79–97)
MONOCYTES # BLD AUTO: 1.09 10*3/MM3 (ref 0.1–0.9)
MONOCYTES NFR BLD AUTO: 7.5 % (ref 5–12)
NEUTROPHILS NFR BLD AUTO: 11.24 10*3/MM3 (ref 1.7–7)
NEUTROPHILS NFR BLD AUTO: 76.8 % (ref 42.7–76)
NITRITE UR QL STRIP: POSITIVE
NRBC BLD AUTO-RTO: 0 /100 WBC (ref 0–0.2)
PH UR STRIP.AUTO: 5.5 [PH] (ref 5–8)
PLATELET # BLD AUTO: 320 10*3/MM3 (ref 140–450)
PMV BLD AUTO: 9.9 FL (ref 6–12)
POTASSIUM SERPL-SCNC: 4.2 MMOL/L (ref 3.5–5.2)
PROT SERPL-MCNC: 6.7 G/DL (ref 6–8.5)
PROT UR QL STRIP: ABNORMAL
RBC # BLD AUTO: 4.49 10*6/MM3 (ref 3.77–5.28)
RBC # UR: ABNORMAL /HPF
REF LAB TEST METHOD: ABNORMAL
SODIUM SERPL-SCNC: 136 MMOL/L (ref 136–145)
SP GR UR STRIP: 1.02 (ref 1–1.03)
SQUAMOUS #/AREA URNS HPF: ABNORMAL /HPF
UROBILINOGEN UR QL STRIP: ABNORMAL
WBC # BLD AUTO: 14.63 10*3/MM3 (ref 3.4–10.8)
WBC CLUMPS # UR AUTO: ABNORMAL /HPF
WBC UR QL AUTO: ABNORMAL /HPF

## 2020-08-02 PROCEDURE — 80053 COMPREHEN METABOLIC PANEL: CPT | Performed by: NURSE PRACTITIONER

## 2020-08-02 PROCEDURE — 96375 TX/PRO/DX INJ NEW DRUG ADDON: CPT

## 2020-08-02 PROCEDURE — 96365 THER/PROPH/DIAG IV INF INIT: CPT

## 2020-08-02 PROCEDURE — 25010000002 CEFTRIAXONE PER 250 MG: Performed by: NURSE PRACTITIONER

## 2020-08-02 PROCEDURE — 87040 BLOOD CULTURE FOR BACTERIA: CPT | Performed by: NURSE PRACTITIONER

## 2020-08-02 PROCEDURE — 25010000002 IOPAMIDOL 61 % SOLUTION: Performed by: EMERGENCY MEDICINE

## 2020-08-02 PROCEDURE — 83605 ASSAY OF LACTIC ACID: CPT | Performed by: NURSE PRACTITIONER

## 2020-08-02 PROCEDURE — P9612 CATHETERIZE FOR URINE SPEC: HCPCS

## 2020-08-02 PROCEDURE — 85025 COMPLETE CBC W/AUTO DIFF WBC: CPT | Performed by: NURSE PRACTITIONER

## 2020-08-02 PROCEDURE — 74177 CT ABD & PELVIS W/CONTRAST: CPT

## 2020-08-02 PROCEDURE — 25010000002 ONDANSETRON PER 1 MG: Performed by: NURSE PRACTITIONER

## 2020-08-02 PROCEDURE — 81001 URINALYSIS AUTO W/SCOPE: CPT | Performed by: NURSE PRACTITIONER

## 2020-08-02 PROCEDURE — 81025 URINE PREGNANCY TEST: CPT | Performed by: NURSE PRACTITIONER

## 2020-08-02 PROCEDURE — 99283 EMERGENCY DEPT VISIT LOW MDM: CPT

## 2020-08-02 PROCEDURE — 25010000002 KETOROLAC TROMETHAMINE PER 15 MG: Performed by: NURSE PRACTITIONER

## 2020-08-02 RX ORDER — SULFAMETHOXAZOLE AND TRIMETHOPRIM 800; 160 MG/1; MG/1
1 TABLET ORAL 2 TIMES DAILY
Qty: 28 TABLET | Refills: 0 | Status: SHIPPED | OUTPATIENT
Start: 2020-08-02 | End: 2020-08-16

## 2020-08-02 RX ORDER — IBUPROFEN 800 MG/1
800 TABLET ORAL EVERY 8 HOURS PRN
Qty: 30 TABLET | Refills: 0 | Status: SHIPPED | OUTPATIENT
Start: 2020-08-02 | End: 2023-01-10

## 2020-08-02 RX ORDER — ONDANSETRON 2 MG/ML
4 INJECTION INTRAMUSCULAR; INTRAVENOUS ONCE
Status: COMPLETED | OUTPATIENT
Start: 2020-08-02 | End: 2020-08-02

## 2020-08-02 RX ORDER — KETOROLAC TROMETHAMINE 15 MG/ML
15 INJECTION, SOLUTION INTRAMUSCULAR; INTRAVENOUS ONCE
Status: COMPLETED | OUTPATIENT
Start: 2020-08-02 | End: 2020-08-02

## 2020-08-02 RX ORDER — SODIUM CHLORIDE 0.9 % (FLUSH) 0.9 %
10 SYRINGE (ML) INJECTION AS NEEDED
Status: DISCONTINUED | OUTPATIENT
Start: 2020-08-02 | End: 2020-08-02 | Stop reason: HOSPADM

## 2020-08-02 RX ORDER — CEFTRIAXONE SODIUM 1 G/50ML
1 INJECTION, SOLUTION INTRAVENOUS ONCE
Status: COMPLETED | OUTPATIENT
Start: 2020-08-02 | End: 2020-08-02

## 2020-08-02 RX ADMIN — ONDANSETRON 4 MG: 2 INJECTION INTRAMUSCULAR; INTRAVENOUS at 13:19

## 2020-08-02 RX ADMIN — SODIUM CHLORIDE 1000 ML: 9 INJECTION, SOLUTION INTRAVENOUS at 13:20

## 2020-08-02 RX ADMIN — IOPAMIDOL 100 ML: 612 INJECTION, SOLUTION INTRAVENOUS at 14:43

## 2020-08-02 RX ADMIN — CEFTRIAXONE SODIUM 1 G: 1 INJECTION, SOLUTION INTRAVENOUS at 15:22

## 2020-08-02 RX ADMIN — KETOROLAC TROMETHAMINE 15 MG: 15 INJECTION, SOLUTION INTRAMUSCULAR; INTRAVENOUS at 13:20

## 2020-08-02 NOTE — ED NOTES
Went to immediate care on Friday, got diagnosed with uti. Has had right flank pain since 730am. This rn wearing all ppe.      Cyndi Ramirez, GILMA  08/02/20 1250

## 2020-08-02 NOTE — DISCHARGE INSTRUCTIONS
Drink a lot of water    Always wipe front to back  Wear cotton panties  Wear no panties to bed  Urinate after intercourse    Return Precautions    Although you are being discharged from the ED today, I encourage you to return for worsening symptoms.  Things can, and do, change such that treatment at home with medication may not be adequate.      Specifically, return for any of the following:    Chest pain, shortness of breath, pain or nausea and vomiting not controlled by medications provided.    Please make a follow up with your Primary Care Provider for a blood pressure recheck.

## 2020-08-02 NOTE — ED PROVIDER NOTES
I supervised care provided by the midlevel provider.   We have discussed this patient's history, physical exam, and treatment plan.  I have reviewed the note and personally saw and examined the patient and agree with the plan of care.   I have seen and evaluated this patient.  For a few days patient has had dysuria and urinary frequency.  It sounds like she was seen and diagnosed with a UTI on Friday but has not started the antibiotics as of yet.  She started getting some right flank pain and went to the urgent care center again today.  They sent her here to the emergency department to make sure she did not have a kidney stone.  Patient has had a history of multiple urinary tract infections in the past and feels similar to past urinary tract infections.  She reports no fever, vomiting, chest pain, cough, or any focal weakness or numbness to any extremities.  She has not missed a period and thinks that it is unlikely that she is pregnant.  She is also never had a history of a kidney stone.    GENERAL: not distressed  HENT: nares patent  Head/neck/ face are symmetric without gross deformity or swelling  EYES: no scleral icterus  CV: regular rhythm, regular rate with intact distal pulses  RESPIRATORY: normal effort and no respiratory distress  ABDOMEN: soft and non-tender.  Obese.  Has some subjective tenderness in the right flank but she has no CVA tenderness with palpation.  Normal bowel sounds.  No signs of peritonitis.  No guarding or rebound.  MUSCULOSKELETAL: no deformity  NEURO: alert and appropriate, moves all extremities, follows commands  SKIN: warm, dry    Vital signs and nursing notes reviewed.    Plan lab work and urinalysis reviewed.  Waiting on pregnancy test.  She very likely has an acute kidney infection.  Potential pyelonephritis.  I think we need to make certain she does not have a kidney stone and we will do a CT scan or once this pregnancy test returns.  We will go ahead and treat her with Rocephin  and culture her urine.  I discussed this with Caitlin Gupta the nurse practitioner as well as the patient.  Currently stable no distress.    We are currently under a pandemic from the COVID19 infection.  The patient presented to the emergency department by ambulance or personal vehicle.  During current hospital restrictions no other visitors were present in the emergency department during my evaluation and treatment. I followed the current protocols required by Infection Control at [facility] in my evaluation and treatment of the patient. The patient was wearing a face mask during my evaluation and throughout my encounter. During my whole encounter with this patient I used appropriate personal protective equipment.  This equipment consisted of eye protection, facemask, gown, and gloves.  I applied this equipment before entering the room.    ED Course as of Aug 02 1905   Sun Aug 02, 2020   1321 Discussed with Dr. Gonzalez.     [EW]   7167 I discussed with the radiologist, Dr. Mccartney, concerning the CT scan of the abdomen pelvis.  She has some mild right ureter stranding.  The kidneys look good bilaterally.  There is no stone and there is no hydronephrosis.  She also has the appearance of a dominant right ovarian cyst that is 3.5 cm.    [MM]      ED Course User Index  [EW] Caitlin Ventura APRN  [MM] Sukhdev Gonzalez MD Molinari, Mark, MD  08/02/20 1905

## 2020-08-02 NOTE — ED TRIAGE NOTES
Pt diagnosed with UTI, given Rx for abx but has not started yet, now having R flank pain, frequency of urination and burning. MD concerned for blood in urine.     Pt placed in mask at triage, all staff wearing appropriate ppe

## 2020-08-02 NOTE — ED PROVIDER NOTES
"EMERGENCY DEPARTMENT ENCOUNTER    Room Number:  21/21  Date seen:  8/2/2020  Time seen: 11:53 AM  PCP: Raymon Trevino MD  Historian: patient, notes from [facility]    HPI:  Chief complaint:right flank pain, hematuria  A complete HPI/ROS/PMH/PSH/SH/FH are unobtainable due to: not applicable  Context:Flora Solis is a 20 y.o. female who presents to the ED with c/o acute right sided flank pain which started this am after riding a bike.  It is not made better or worse by anything.  She has no n/v.  She reports recent [facility] visit on Friday  for hematuria and cloudy urine but has not been able to  antibiotics for UTI because, \"they weren't ready\".  She has still had some blood noted in her urine and when wiping.  LMP was about 1 week ago, denies new sex partners and she is not having vaginal discharge. Denies h/o kidney stones or abdominal surgeries.     Patient was placed in face mask in first look. Patient was wearing facemask when I entered the room and throughout our encounter. I wore full protective equipment throughout this patient encounter including a face mask, eye shield and gloves. Hand hygiene/washing of hands was performed before donning protective equipment and after removal when leaving the room.    MEDICAL RECORD REVIEW    ALLERGIES  Cephalexin    PAST MEDICAL HISTORY  Active Ambulatory Problems     Diagnosis Date Noted   • Irregular menses 07/18/2017   • Vitamin D deficiency 07/18/2017   • Morbid obesity (CMS/[facility]) 07/18/2017   • Acanthosis nigricans 12/18/2017   • PCOS (polycystic ovarian syndrome) 12/18/2017   • Hyperprolactinemia (CMS/[facility]) 12/26/2017   • Hyperuricemia 12/26/2017   • Hypothyroidism 01/04/2018   • Abdominal cramping 05/31/2018   • History of miscarriage 05/31/2018   • Missed period 10/05/2017   • Prediabetes 05/31/2018   • Hair loss 02/07/2020     Resolved Ambulatory Problems     Diagnosis Date Noted   • No Resolved Ambulatory Problems     Past Medical History:   Diagnosis Date   • " Migraine    • Polycystic ovary syndrome    • Pre-diabetes        PAST SURGICAL HISTORY  Past Surgical History:   Procedure Laterality Date   • INNER EAR SURGERY     • WISDOM TOOTH EXTRACTION         FAMILY HISTORY  Family History   Problem Relation Age of Onset   • Asthma Father    • Hypertension Mother    • Diabetes Mother    • Hypertension Maternal Grandmother    • Hypertension Maternal Grandfather    • Breast cancer Neg Hx    • Ovarian cancer Neg Hx    • Uterine cancer Neg Hx    • Colon cancer Neg Hx        SOCIAL HISTORY  Social History     Socioeconomic History   • Marital status: Single     Spouse name: Not on file   • Number of children: Not on file   • Years of education: Not on file   • Highest education level: Not on file   Tobacco Use   • Smoking status: Never Smoker   • Smokeless tobacco: Never Used   Substance and Sexual Activity   • Alcohol use: Yes     Comment: occas   • Drug use: No   • Sexual activity: Yes     Partners: Male     Birth control/protection: OCP       REVIEW OF SYSTEMS  Review of Systems    All systems reviewed and negative except for those discussed in HPI.     PHYSICAL EXAM    ED Triage Vitals [08/02/20 1147]   Temp Heart Rate Resp BP SpO2   96.7 °F (35.9 °C) 115 18 -- 97 %      Temp src Heart Rate Source Patient Position BP Location FiO2 (%)   -- -- -- -- --     Physical Exam    I have reviewed the triage vital signs and nursing notes.      GENERAL: not distressed  HENT: nares patent, mm moist  EYES: no scleral icterus  NECK: no ROM limitations  CV: regular rhythm, tachycardia, no murmurs, no rubs and no gallups  RESPIRATORY: normal effort, CTAB  ABDOMEN: soft, rounded, mild right CVA tenderness, no rash to flank  : deferred  MUSCULOSKELETAL: no deformity  NEURO: alert, moves all extremities, follows commands  SKIN: warm, dry    LAB RESULTS  Recent Results (from the past 24 hour(s))   Comprehensive Metabolic Panel    Collection Time: 08/02/20 12:56 PM   Result Value Ref Range     Glucose 81 65 - 99 mg/dL    BUN 12 6 - 20 mg/dL    Creatinine 0.77 0.57 - 1.00 mg/dL    Sodium 136 136 - 145 mmol/L    Potassium 4.2 3.5 - 5.2 mmol/L    Chloride 103 98 - 107 mmol/L    CO2 22.8 22.0 - 29.0 mmol/L    Calcium 9.1 8.6 - 10.5 mg/dL    Total Protein 6.7 6.0 - 8.5 g/dL    Albumin 3.90 3.50 - 5.20 g/dL    ALT (SGPT) 15 1 - 33 U/L    AST (SGOT) 11 1 - 32 U/L    Alkaline Phosphatase 50 39 - 117 U/L    Total Bilirubin 0.2 0.0 - 1.2 mg/dL    eGFR Non African Amer 95 >60 mL/min/1.73    Globulin 2.8 gm/dL    A/G Ratio 1.4 g/dL    BUN/Creatinine Ratio 15.6 7.0 - 25.0    Anion Gap 10.2 5.0 - 15.0 mmol/L   CBC Auto Differential    Collection Time: 08/02/20 12:56 PM   Result Value Ref Range    WBC 14.63 (H) 3.40 - 10.80 10*3/mm3    RBC 4.49 3.77 - 5.28 10*6/mm3    Hemoglobin 11.9 (L) 12.0 - 15.9 g/dL    Hematocrit 37.6 34.0 - 46.6 %    MCV 83.7 79.0 - 97.0 fL    MCH 26.5 (L) 26.6 - 33.0 pg    MCHC 31.6 31.5 - 35.7 g/dL    RDW 13.7 12.3 - 15.4 %    RDW-SD 42.8 37.0 - 54.0 fl    MPV 9.9 6.0 - 12.0 fL    Platelets 320 140 - 450 10*3/mm3    Neutrophil % 76.8 (H) 42.7 - 76.0 %    Lymphocyte % 14.0 (L) 19.6 - 45.3 %    Monocyte % 7.5 5.0 - 12.0 %    Eosinophil % 1.0 0.3 - 6.2 %    Basophil % 0.4 0.0 - 1.5 %    Immature Grans % 0.3 0.0 - 0.5 %    Neutrophils, Absolute 11.24 (H) 1.70 - 7.00 10*3/mm3    Lymphocytes, Absolute 2.05 0.70 - 3.10 10*3/mm3    Monocytes, Absolute 1.09 (H) 0.10 - 0.90 10*3/mm3    Eosinophils, Absolute 0.14 0.00 - 0.40 10*3/mm3    Basophils, Absolute 0.06 0.00 - 0.20 10*3/mm3    Immature Grans, Absolute 0.05 0.00 - 0.05 10*3/mm3    nRBC 0.0 0.0 - 0.2 /100 WBC   Lactic Acid, Plasma    Collection Time: 08/02/20  1:16 PM   Result Value Ref Range    Lactate 0.8 0.5 - 2.0 mmol/L   Urinalysis With Microscopic If Indicated (No Culture) - Urine, Catheter    Collection Time: 08/02/20  1:18 PM   Result Value Ref Range    Color, UA Yellow Yellow, Straw    Appearance, UA Cloudy (A) Clear    pH, UA 5.5 5.0 - 8.0     Specific Gravity, UA 1.019 1.005 - 1.030    Glucose, UA Negative Negative    Ketones, UA Trace (A) Negative    Bilirubin, UA Negative Negative    Blood, UA Large (3+) (A) Negative    Protein,  mg/dL (2+) (A) Negative    Leuk Esterase, UA Large (3+) (A) Negative    Nitrite, UA Positive (A) Negative    Urobilinogen, UA 0.2 E.U./dL 0.2 - 1.0 E.U./dL   Urinalysis, Microscopic Only - Urine, Catheter    Collection Time: 08/02/20  1:18 PM   Result Value Ref Range    RBC, UA 3-5 (A) None Seen, 0-2 /HPF    WBC, UA Too Numerous to Count (A) None Seen, 0-2 /HPF    Bacteria, UA 4+ (A) None Seen /HPF    Squamous Epithelial Cells, UA 7-12 (A) None Seen, 0-2 /HPF    Hyaline Casts, UA None Seen None Seen /LPF    WBC Clumps, UA Moderate/2+ None Seen /HPF    Methodology Manual Light Microscopy    Pregnancy, Urine - Urine, Clean Catch    Collection Time: 08/02/20  2:13 PM   Result Value Ref Range    HCG, Urine QL Negative Negative         RADIOLOGY RESULTS  CT Abdomen Pelvis With Contrast   Final Result   1.There is mild stranding surrounding the mid right ureter that is   normal in caliber. This is nonspecific. The right kidney and urinary   bladder appear normal. This may be associated with a previously passed   right ureteral calculus.   2. There is a 3.5 cm right ovarian cyst most consistent with a dominant   follicle.       This report was finalized on 8/2/2020 2:55 PM by Dr. Marlo Mccartney M.D.                PROGRESS, DATA ANALYSIS, CONSULTS AND MEDICAL DECISION MAKING  All labs have been independently reviewed by me.  All radiology studies have been reviewed by me and discussed with radiologist dictating the report.  EKG's independently viewed and interpreted by me unless stated otherwise. Discussion below represents my analysis of pertinent findings related to patient's condition, differential diagnosis, treatment plan and final disposition.     ED Course as of Aug 02 1636   Sun Aug 02, 2020   1321 Discussed with  "Dr. Gonzalez.     [EW]   3811 I discussed with the radiologist, Dr. Mccartney, concerning the CT scan of the abdomen pelvis.  She has some mild right ureter stranding.  The kidneys look good bilaterally.  There is no stone and there is no hydronephrosis.  She also has the appearance of a dominant right ovarian cyst that is 3.5 cm.    [MM]      ED Course User Index  [EW] Caitlin Ventura APRN  [MM] Sukhdev Gonzalez MD     DDX: UTI, pyelonephritis, appendicitis, ovarian cyst    MDM: The patient does have mild pyelonephritis noted on imaging.  We will prescribe Bactrim DS for the patient for 2 days.  She is appropriate for home discharge as she can tolerate p.o. and pain is controlled in the emergency department.  I stressed multiple times that she needs to go straight to the pharmacy to  the medication.  Strict return to ER precautions given to patient    1400: Reviewed pt's history and workup with Dr. Gonzalez.  After a bedside evaluation, Dr. Gonzalez agrees with the plan of care.    The patient's history, physical exam, and lab findings were discussed with the physician, who also performed a face to face history and physical exam.  I discussed all results and noted any abnormalities with patient.  Discussed absoute need to recheck abnormalities with their family physician.  I answered any of the patient's questions.  Discussed plan for discharge, as there is no emergent indication for admission.  Pt is agreeable and understands need for follow up and repeat testing.  Pt is aware that discharge does not mean that nothing is wrong but it indicates no emergency is present and they must continue care with their family physician.  Pt is discharged with instructions to follow up with primary care doctor to have their blood pressure rechecked.         Disposition vitals:  /75 (Patient Position: Lying)   Pulse 86   Temp 96.7 °F (35.9 °C)   Resp 17   Ht 165.1 cm (65\")   Wt 120 kg (265 lb)   LMP 07/28/2020 " (Approximate)   SpO2 97%   BMI 44.10 kg/m²       DIAGNOSIS  Final diagnoses:   Pyelonephritis   Hematuria, unspecified type   Cyst of right ovary       FOLLOW UP   Raymon Trevino MD  [address] [facility] [address] [phone]    Schedule an appointment as soon as possible for a visit   As needed         Caitlin Ventura, APRN  08/02/20 6747

## 2020-08-06 ENCOUNTER — OFFICE VISIT (OUTPATIENT)
Dept: OBSTETRICS AND GYNECOLOGY | Age: 20
End: 2020-08-06

## 2020-08-06 VITALS
WEIGHT: 261 LBS | BODY MASS INDEX: 43.49 KG/M2 | DIASTOLIC BLOOD PRESSURE: 68 MMHG | HEIGHT: 65 IN | SYSTOLIC BLOOD PRESSURE: 110 MMHG

## 2020-08-06 DIAGNOSIS — N93.9 VAGINAL SPOTTING: Primary | ICD-10-CM

## 2020-08-06 PROCEDURE — 99213 OFFICE O/P EST LOW 20 MIN: CPT | Performed by: OBSTETRICS & GYNECOLOGY

## 2020-08-06 NOTE — PROGRESS NOTES
"Chief Complaint   Patient presents with   • Follow-up     pt c/o bright red spotting on her toilet paper this morning. went to the ER over the weekend and was dx with a UTI, kidney infection, and RT ovarian cyst     Flora Solis presents to discuss some vaginal bleeding. She noted that when she went to the bathroom and wiped this morning she had some light brown bleeding, then more recently was red bleeding.  She also was dx with pyelo this week in the ED and is on 14 day course of TMP SMX.  Hx of irregular menses, off contraception and notes periods have been regular recently, does desire pregnancy    /68   Ht 165.1 cm (65\")   Wt 118 kg (261 lb)   LMP 07/28/2020 (Approximate)   Breastfeeding No   BMI 43.43 kg/m²   Appears well, no distress  Regular, nonlabored breathing    A/P  Spotting v hematuria    Pain w pyelo much improved but has had micro hematuria with this pyelo and possibly has had urinary bleeding.  Could also be having intermenstrual bleeding, hx of irreg menses  Either way, additional tx not really warranted currently, already on antbx and pregnancy test negative. Will see how her bleeding is the next few days and let us know if problematic    Reiterated weight loss for fertility    Jana Galloway MD  8/6/2020  15:10    "

## 2020-08-07 LAB
BACTERIA SPEC AEROBE CULT: NORMAL
BACTERIA SPEC AEROBE CULT: NORMAL

## 2020-08-10 RX ORDER — ALLOPURINOL 100 MG/1
100 TABLET ORAL DAILY
Qty: 90 TABLET | Refills: 3 | Status: SHIPPED | OUTPATIENT
Start: 2020-08-10 | End: 2021-08-10

## 2020-08-10 RX ORDER — ERGOCALCIFEROL 1.25 MG/1
CAPSULE ORAL
Qty: 26 CAPSULE | Refills: 3 | Status: SHIPPED | OUTPATIENT
Start: 2020-08-10 | End: 2020-08-11 | Stop reason: SDUPTHER

## 2020-08-11 RX ORDER — ERGOCALCIFEROL 1.25 MG/1
CAPSULE ORAL
Qty: 26 CAPSULE | Refills: 3 | Status: SHIPPED | OUTPATIENT
Start: 2020-08-11 | End: 2021-08-12

## 2020-08-14 RX ORDER — LEVOTHYROXINE SODIUM 100 UG/1
100 TABLET ORAL DAILY
Qty: 90 TABLET | Refills: 3 | Status: SHIPPED | OUTPATIENT
Start: 2020-08-14 | End: 2021-05-17

## 2020-09-18 ENCOUNTER — RESULTS ENCOUNTER (OUTPATIENT)
Dept: ENDOCRINOLOGY | Age: 20
End: 2020-09-18

## 2020-09-18 DIAGNOSIS — E55.9 VITAMIN D DEFICIENCY: ICD-10-CM

## 2020-09-18 DIAGNOSIS — E03.9 ACQUIRED HYPOTHYROIDISM: ICD-10-CM

## 2020-09-18 DIAGNOSIS — E03.9 ACQUIRED HYPOTHYROIDISM: Primary | ICD-10-CM

## 2020-09-18 DIAGNOSIS — E28.2 PCOS (POLYCYSTIC OVARIAN SYNDROME): ICD-10-CM

## 2020-09-18 DIAGNOSIS — R73.03 PREDIABETES: ICD-10-CM

## 2020-11-03 LAB
25(OH)D3+25(OH)D2 SERPL-MCNC: 55.9 NG/ML (ref 30–100)
ALBUMIN SERPL-MCNC: 4.4 G/DL (ref 3.9–5)
ALBUMIN/CREAT UR: 11 MG/G CREAT (ref 0–29)
ALBUMIN/GLOB SERPL: 1.6 {RATIO} (ref 1.2–2.2)
ALP SERPL-CCNC: 60 IU/L (ref 39–117)
ALT SERPL-CCNC: 14 IU/L (ref 0–32)
AMBIG ABBREV CMP14 DEFAULT: NORMAL
AST SERPL-CCNC: 12 IU/L (ref 0–40)
BILIRUB SERPL-MCNC: <0.2 MG/DL (ref 0–1.2)
BUN SERPL-MCNC: 11 MG/DL (ref 6–20)
BUN/CREAT SERPL: 14 (ref 9–23)
CALCIUM SERPL-MCNC: 9.6 MG/DL (ref 8.7–10.2)
CHLORIDE SERPL-SCNC: 102 MMOL/L (ref 96–106)
CHOLEST SERPL-MCNC: 164 MG/DL (ref 100–199)
CO2 SERPL-SCNC: 24 MMOL/L (ref 20–29)
CREAT SERPL-MCNC: 0.79 MG/DL (ref 0.57–1)
CREAT UR-MCNC: 92.1 MG/DL
GLOBULIN SER CALC-MCNC: 2.7 G/DL (ref 1.5–4.5)
GLUCOSE SERPL-MCNC: 91 MG/DL (ref 65–99)
HBA1C MFR BLD: 5.4 % (ref 4.8–5.6)
HDLC SERPL-MCNC: 35 MG/DL
INTERPRETATION: NORMAL
LDLC SERPL CALC-MCNC: 104 MG/DL (ref 0–99)
MICROALBUMIN UR-MCNC: 10.2 UG/ML
POTASSIUM SERPL-SCNC: 4.6 MMOL/L (ref 3.5–5.2)
PROT SERPL-MCNC: 7.1 G/DL (ref 6–8.5)
SODIUM SERPL-SCNC: 141 MMOL/L (ref 134–144)
T3FREE SERPL-MCNC: 2.9 PG/ML (ref 2–4.4)
T4 FREE SERPL-MCNC: 1.1 NG/DL (ref 0.82–1.77)
TRIGL SERPL-MCNC: 142 MG/DL (ref 0–149)
TSH SERPL DL<=0.005 MIU/L-ACNC: 2.34 UIU/ML (ref 0.45–4.5)
VLDLC SERPL CALC-MCNC: 25 MG/DL (ref 5–40)

## 2020-11-16 ENCOUNTER — OFFICE VISIT (OUTPATIENT)
Dept: ENDOCRINOLOGY | Age: 20
End: 2020-11-16

## 2020-11-16 VITALS
BODY MASS INDEX: 46.32 KG/M2 | WEIGHT: 278 LBS | OXYGEN SATURATION: 99 % | SYSTOLIC BLOOD PRESSURE: 104 MMHG | DIASTOLIC BLOOD PRESSURE: 54 MMHG | HEART RATE: 80 BPM | RESPIRATION RATE: 16 BRPM | HEIGHT: 65 IN

## 2020-11-16 DIAGNOSIS — E55.9 VITAMIN D DEFICIENCY: ICD-10-CM

## 2020-11-16 DIAGNOSIS — E03.9 ACQUIRED HYPOTHYROIDISM: Primary | ICD-10-CM

## 2020-11-16 DIAGNOSIS — R73.03 PREDIABETES: ICD-10-CM

## 2020-11-16 DIAGNOSIS — E66.01 MORBID OBESITY (HCC): ICD-10-CM

## 2020-11-16 PROCEDURE — 99214 OFFICE O/P EST MOD 30 MIN: CPT | Performed by: INTERNAL MEDICINE

## 2020-11-16 RX ORDER — METFORMIN HYDROCHLORIDE 750 MG/1
750 TABLET, EXTENDED RELEASE ORAL
COMMUNITY
End: 2020-11-16 | Stop reason: SDUPTHER

## 2020-11-16 RX ORDER — CYCLOBENZAPRINE HCL 5 MG
5 TABLET ORAL DAILY PRN
COMMUNITY
Start: 2020-10-19 | End: 2021-05-17

## 2020-11-16 RX ORDER — METFORMIN HYDROCHLORIDE 750 MG/1
750 TABLET, EXTENDED RELEASE ORAL
Qty: 30 TABLET | Refills: 11 | Status: SHIPPED | OUTPATIENT
Start: 2020-11-16 | End: 2021-11-19

## 2020-11-16 NOTE — PROGRESS NOTES
20 y.o.    Patient Care Team:  Raymon Trevino MD as PCP - General (Family Medicine)    Chief Complaint:      Subjective     HPI  20-year-old white female is here for the follow-up of hypothyroidism, hirsutism, questionable history of PCOS.  Patient used to see Dr. Will and his nurse practitioner in the past.  She transferred her care to me on 11/16/2020.    Today in clinic patient reports that she is on Levoxyl 100 mcg oral daily.  Takes medication on empty stomach.  Compliant with the medication.    Her energy levels are stable, still continues to have issues with difficulty in losing weight.  And with the Covid pandemic she has stopped going to the gym and as a result she had difficulty in losing the weight.  No other significant hyper and hypothyroid symptoms.    Menstrual cycles are irregular, has been having 1 cycle every 2 weeks, flow has been heavy, she does not want to do birth control pills as she is planning a pregnancy.    Insulin resistance  Supposed to be on metformin 700 mg oral daily with breakfast.  Could not renew the prescription as she has not seen the physician.          Reviewed primary care physician's/consulting physician documentation and lab results       Interval History      The following portions of the patient's history were reviewed and updated as appropriate: allergies, current medications, past family history, past medical history, past social history, past surgical history and problem list.    Past Medical History:   Diagnosis Date   • Hypothyroidism    • Migraine    • Polycystic ovary syndrome    • Pre-diabetes    • Vitamin D deficiency    • Vitamin D deficiency      Family History   Problem Relation Age of Onset   • Asthma Father    • Hypertension Mother    • Diabetes Mother    • Hypertension Maternal Grandmother    • Hypertension Maternal Grandfather    • Breast cancer Neg Hx    • Ovarian cancer Neg Hx    • Uterine cancer Neg Hx    • Colon cancer Neg Hx      Social History      Socioeconomic History   • Marital status: Significant Other     Spouse name: Not on file   • Number of children: Not on file   • Years of education: Not on file   • Highest education level: Not on file   Tobacco Use   • Smoking status: Never Smoker   • Smokeless tobacco: Never Used   Substance and Sexual Activity   • Alcohol use: Yes     Comment: occas   • Drug use: No   • Sexual activity: Yes     Partners: Male     Birth control/protection: OCP     Allergies   Allergen Reactions   • Cephalexin Hives       Current Outpatient Medications:   •  allopurinol (ZYLOPRIM) 100 MG tablet, TAKE 1 TABLET BY MOUTH DAILY, Disp: 90 tablet, Rfl: 3  •  cyclobenzaprine (FLEXERIL) 5 MG tablet, Take 5 mg by mouth Daily As Needed., Disp: , Rfl:   •  fexofenadine (ALLEGRA) 180 MG tablet, Take 180 mg by mouth., Disp: , Rfl:   •  ibuprofen (ADVIL,MOTRIN) 800 MG tablet, Take 1 tablet by mouth Every 8 (Eight) Hours As Needed for Moderate Pain ., Disp: 30 tablet, Rfl: 0  •  LEVOXYL 100 MCG tablet, TAKE 1 TABLET BY MOUTH DAILY, Disp: 90 tablet, Rfl: 3  •  metFORMIN ER (GLUCOPHAGE-XR) 750 MG 24 hr tablet, Take 1 tablet by mouth Daily With Breakfast., Disp: 30 tablet, Rfl: 11  •  naproxen (NAPROSYN) 500 MG tablet, TK 1 T PO BID PRF PAIN, Disp: , Rfl: 0  •  RaNITidine HCl (ZANTAC PO), Take 1 tablet by mouth 2 (Two) Times a Day., Disp: , Rfl:   •  spironolactone (ALDACTONE) 100 MG tablet, TAKE 1 TABLET BY MOUTH DAILY, Disp: 30 tablet, Rfl: 2  •  vitamin D (ERGOCALCIFEROL) 1.25 MG (42570 UT) capsule capsule, TAKE 1 CAPSULE BY MOUTH TWICE WEEKLY, Disp: 26 capsule, Rfl: 3        Review of Systems   Constitutional: Positive for appetite change and fatigue. Negative for fever.   Eyes: Negative for visual disturbance.   Respiratory: Negative for shortness of breath.    Cardiovascular: Negative for palpitations and leg swelling.   Gastrointestinal: Negative for abdominal pain and vomiting.   Endocrine: Negative for polydipsia and polyuria.  "  Musculoskeletal: Negative for joint swelling and neck pain.   Skin: Negative for rash.   Neurological: Negative for weakness and numbness.   Psychiatric/Behavioral: Negative for behavioral problems.     I have reviewed and confirmed the accuracy of the ROS as documented by the MA/LPN/RN Homer Bean MD      Objective       Vitals:    11/16/20 1441   BP: 104/54   Pulse: 80   Resp: 16   SpO2: 99%   Weight: 126 kg (278 lb)   Height: 165.1 cm (65\")     Body mass index is 46.26 kg/m².      Physical Exam  Vitals signs reviewed.   Constitutional:       Appearance: She is not diaphoretic.      Comments: Obese     HENT:      Head: Normocephalic and atraumatic.   Eyes:      General: No scleral icterus.     Conjunctiva/sclera: Conjunctivae normal.   Neck:      Musculoskeletal: Normal range of motion and neck supple.      Thyroid: No thyromegaly.      Comments: Acanthosis nigricans  Cardiovascular:      Rate and Rhythm: Normal rate.      Heart sounds: Normal heart sounds.   Pulmonary:      Effort: Pulmonary effort is normal.      Breath sounds: Normal breath sounds. No stridor. No wheezing.   Abdominal:      General: Bowel sounds are normal. There is no distension.      Palpations: Abdomen is soft.      Tenderness: There is no abdominal tenderness.      Comments: Central obesity   Musculoskeletal:         General: No tenderness.   Skin:     General: Skin is warm and dry.   Neurological:      Mental Status: She is alert and oriented to person, place, and time.         Results Review:     I reviewed the patient's new clinical results and mentioned them above in HPI and in plan as well.    Medical records reviewed  Summary:Done      Orders Only on 11/02/2020   Component Date Value Ref Range Status   • Glucose 11/02/2020 91  65 - 99 mg/dL Final   • BUN 11/02/2020 11  6 - 20 mg/dL Final   • Creatinine 11/02/2020 0.79  0.57 - 1.00 mg/dL Final   • eGFR Non African Am 11/02/2020 107  >59 mL/min/1.73 Final   • eGFR African Am " 11/02/2020 124  >59 mL/min/1.73 Final   • BUN/Creatinine Ratio 11/02/2020 14  9 - 23 Final   • Sodium 11/02/2020 141  134 - 144 mmol/L Final   • Potassium 11/02/2020 4.6  3.5 - 5.2 mmol/L Final   • Chloride 11/02/2020 102  96 - 106 mmol/L Final   • Total CO2 11/02/2020 24  20 - 29 mmol/L Final   • Calcium 11/02/2020 9.6  8.7 - 10.2 mg/dL Final   • Total Protein 11/02/2020 7.1  6.0 - 8.5 g/dL Final   • Albumin 11/02/2020 4.4  3.9 - 5.0 g/dL Final   • Globulin 11/02/2020 2.7  1.5 - 4.5 g/dL Final   • A/G Ratio 11/02/2020 1.6  1.2 - 2.2 Final   • Total Bilirubin 11/02/2020 <0.2  0.0 - 1.2 mg/dL Final   • Alkaline Phosphatase 11/02/2020 60  39 - 117 IU/L Final   • AST (SGOT) 11/02/2020 12  0 - 40 IU/L Final   • ALT (SGPT) 11/02/2020 14  0 - 32 IU/L Final   • Total Cholesterol 11/02/2020 164  100 - 199 mg/dL Final   • Triglycerides 11/02/2020 142  0 - 149 mg/dL Final   • HDL Cholesterol 11/02/2020 35* >39 mg/dL Final   • VLDL Cholesterol Gabriel 11/02/2020 25  5 - 40 mg/dL Final   • LDL Chol Calc (NIH) 11/02/2020 104* 0 - 99 mg/dL Final   • Creatinine, Urine 11/02/2020 92.1  Not Estab. mg/dL Final   • Microalbumin, Urine 11/02/2020 10.2  Not Estab. ug/mL Final   • Microalbumin/Creatinine Ratio 11/02/2020 11  0 - 29 mg/g creat Final    Comment:                        Normal:                0 -  29                         Moderately increased: 30 - 300                         Severely increased:       >300     • Hemoglobin A1C 11/02/2020 5.4  4.8 - 5.6 % Final    Comment:          Prediabetes: 5.7 - 6.4           Diabetes: >6.4           Glycemic control for adults with diabetes: <7.0     • Free T4 11/02/2020 1.10  0.82 - 1.77 ng/dL Final   • TSH 11/02/2020 2.340  0.450 - 4.500 uIU/mL Final   • 25 Hydroxy, Vitamin D 11/02/2020 55.9  30.0 - 100.0 ng/mL Final    Comment: Vitamin D deficiency has been defined by the [place] of  Medicine and an Endocrine Society practice guideline as a  level of serum 25-OH vitamin D less than  20 ng/mL (1,2).  The Endocrine Society went on to further define vitamin D  insufficiency as a level between 21 and 29 ng/mL (2).  1. IOM ([place] of Medicine). 2010. Dietary reference     intakes for calcium and D. [place]: The     National Academies Press.  2. Sancho MF, Yfn ALICIA, Nena MEYER, et al.     Evaluation, treatment, and prevention of vitamin D     deficiency: an Endocrine Society clinical practice     guideline. JCEM. 2011 Jul; 96(7):1911-30.     • T3, Free 11/02/2020 2.9  2.0 - 4.4 pg/mL Final   • Romel Acevedo CMP14 Default 11/02/2020 Comment   Final    Comment: A hand-written panel/profile was received from your office. In  accordance with the [employer] [facility] Ambiguous Test Code Policy dated July 2003, we have completed your order by using the closest currently  or formerly recognized AMA panel.  We have assigned Comprehensive  Metabolic Panel (14), Test Code #222110 to this request.  If this  is not the testing you wished to receive on this specimen, please  contact the [employer] [facility] Client Inquiry/Technical Services Department  to clarify the test order.  We appreciate your business.     • Interpretation 11/02/2020 Note   Final    Supplemental report is available.     Lab Results   Component Value Date    HGBA1C 5.4 11/02/2020    HGBA1C 5.4 09/22/2020    HGBA1C 5.50 01/24/2020     Lab Results   Component Value Date    MICROALBUR 10.2 11/02/2020    CREATININE 0.79 11/02/2020     Imaging Results (Most Recent)     None                Assessment and Plan:    Diagnoses and all orders for this visit:    1. Acquired hypothyroidism (Primary)  -     TSH; Future  -     T4, Free; Future  -     TestT+TestF+SHBG; Future  -     Basic Metabolic Panel; Future    2. Prediabetes  -     TSH; Future  -     T4, Free; Future  -     TestT+TestF+SHBG; Future  -     Basic Metabolic Panel; Future    3. Vitamin D deficiency  -     TSH; Future  -     T4, Free; Future  -     TestT+TestF+SHBG; Future  -     Basic Metabolic  "Panel; Future    4. Morbid obesity (CMS/HCC)  -     TSH; Future  -     T4, Free; Future  -     TestT+TestF+SHBG; Future  -     Basic Metabolic Panel; Future    Other orders  -     metFORMIN ER (GLUCOPHAGE-XR) 750 MG 24 hr tablet; Take 1 tablet by mouth Daily With Breakfast.  Dispense: 30 tablet; Refill: 11      Hypothyroidism  Check thyroid panel  Continue Levoxyl    Prediabetes  Restart metformin    Concerns for hirsutism  Continue Aldactone  Check testosterone panel    Advised the patient to alert us immediately when she gets to know if she is pregnant as her thyroid levels needs to be adjusted during pregnancy.    Obesity-worse  Gave handout in AVS about calorie counting and exercise regimen to help with the weight loss.    All the above problems are new for me    Reviewed Lab results with the patient.               Homer Bean MD  11/16/20    EMR Dragon / transcription disclaimer:     \"Dictated utilizing Dragon dictation\".      "

## 2020-12-30 RX ORDER — SPIRONOLACTONE 100 MG/1
100 TABLET, FILM COATED ORAL DAILY
Qty: 30 TABLET | Refills: 2 | Status: SHIPPED | OUTPATIENT
Start: 2020-12-30 | End: 2021-06-24

## 2021-04-16 ENCOUNTER — BULK ORDERING (OUTPATIENT)
Dept: CASE MANAGEMENT | Facility: OTHER | Age: 21
End: 2021-04-16

## 2021-04-16 DIAGNOSIS — Z23 IMMUNIZATION DUE: ICD-10-CM

## 2021-05-06 ENCOUNTER — LAB (OUTPATIENT)
Dept: ENDOCRINOLOGY | Age: 21
End: 2021-05-06

## 2021-05-06 DIAGNOSIS — R73.03 PREDIABETES: ICD-10-CM

## 2021-05-06 DIAGNOSIS — E55.9 VITAMIN D DEFICIENCY: ICD-10-CM

## 2021-05-06 DIAGNOSIS — E03.9 ACQUIRED HYPOTHYROIDISM: ICD-10-CM

## 2021-05-06 DIAGNOSIS — E66.01 MORBID OBESITY (HCC): ICD-10-CM

## 2021-05-10 LAB
BUN SERPL-MCNC: 10 MG/DL (ref 6–20)
BUN/CREAT SERPL: 13.5 (ref 7–25)
CALCIUM SERPL-MCNC: 9.8 MG/DL (ref 8.6–10.5)
CHLORIDE SERPL-SCNC: 103 MMOL/L (ref 98–107)
CO2 SERPL-SCNC: 23.4 MMOL/L (ref 22–29)
CREAT SERPL-MCNC: 0.74 MG/DL (ref 0.57–1)
GLUCOSE SERPL-MCNC: 79 MG/DL (ref 65–99)
POTASSIUM SERPL-SCNC: 4.7 MMOL/L (ref 3.5–5.2)
SHBG SERPL-SCNC: 15.3 NMOL/L (ref 24.6–122)
SODIUM SERPL-SCNC: 137 MMOL/L (ref 136–145)
T4 FREE SERPL-MCNC: 1.41 NG/DL (ref 0.93–1.7)
TESTOST FREE SERPL-MCNC: 2.4 PG/ML (ref 0–4.2)
TESTOST SERPL-MCNC: 53 NG/DL (ref 8–48)
TSH SERPL DL<=0.005 MIU/L-ACNC: 3.35 UIU/ML (ref 0.27–4.2)

## 2021-05-17 ENCOUNTER — OFFICE VISIT (OUTPATIENT)
Dept: ENDOCRINOLOGY | Age: 21
End: 2021-05-17

## 2021-05-17 VITALS
HEIGHT: 65 IN | WEIGHT: 278 LBS | BODY MASS INDEX: 46.32 KG/M2 | SYSTOLIC BLOOD PRESSURE: 122 MMHG | OXYGEN SATURATION: 98 % | DIASTOLIC BLOOD PRESSURE: 66 MMHG | HEART RATE: 65 BPM

## 2021-05-17 DIAGNOSIS — E66.01 MORBID OBESITY (HCC): ICD-10-CM

## 2021-05-17 DIAGNOSIS — E03.9 ACQUIRED HYPOTHYROIDISM: Primary | ICD-10-CM

## 2021-05-17 DIAGNOSIS — R73.03 PREDIABETES: ICD-10-CM

## 2021-05-17 DIAGNOSIS — E28.2 PCOS (POLYCYSTIC OVARIAN SYNDROME): ICD-10-CM

## 2021-05-17 PROCEDURE — 99214 OFFICE O/P EST MOD 30 MIN: CPT | Performed by: INTERNAL MEDICINE

## 2021-05-17 RX ORDER — LEVOTHYROXINE SODIUM 112 UG/1
112 TABLET ORAL DAILY
Qty: 30 TABLET | Refills: 11 | Status: SHIPPED | OUTPATIENT
Start: 2021-05-17 | End: 2022-05-31

## 2021-05-17 RX ORDER — NORETHINDRONE ACETATE AND ETHINYL ESTRADIOL AND FERROUS FUMARATE 1MG-20(21)
1 KIT ORAL DAILY
COMMUNITY
Start: 2021-04-22 | End: 2021-05-17

## 2021-05-17 RX ORDER — FAMOTIDINE 20 MG/1
20 TABLET, FILM COATED ORAL 2 TIMES DAILY PRN
COMMUNITY
Start: 2021-04-22

## 2021-05-17 RX ORDER — LANOLIN ALCOHOL/MO/W.PET/CERES
325 CREAM (GRAM) TOPICAL DAILY
COMMUNITY
Start: 2021-02-05 | End: 2022-04-12 | Stop reason: SDUPTHER

## 2021-05-17 RX ORDER — NORETHINDRONE ACETATE AND ETHINYL ESTRADIOL 1MG-20(21)
1 KIT ORAL DAILY
COMMUNITY
Start: 2021-02-01 | End: 2021-05-17

## 2021-05-17 NOTE — PROGRESS NOTES
"Chief Complaint  Hyperlipidemia, Polycystic Ovary Syndrome, hyperprolactinemia, and Hypothyroidism    Subjective            History of Present Illness  Flora Solis,21 y.o. is here as a follow-up for the evaluation of hypothyroidism and pre - Dm.     Hypothyroidism - on levothyroxine 100 mc oral daily, takes the medication on empty stomach. Compliant with the medication.     Energy levels are ok, weight has been stable. Does have family hx of thyroid disease.   LMP - 4/11/21, lasted 10 days.     PCOS - does have increased testosterone levels, stopped Birth control pills as she was planning to get pregnant.     Reviewed primary care physician's/consulting physician documentation and lab results     I have reviewed the patient's allergies, medicines, past medical hx, family hx and social hx in detail.    Objective   Vital Signs:   /66 (BP Location: Left arm, Patient Position: Sitting, Cuff Size: Large Adult)   Pulse 65   Ht 165.1 cm (65\")   Wt 126 kg (278 lb)   SpO2 98%   BMI 46.26 kg/m²     Physical Exam   General appearance - no distress  Eyes- anicteric sclera  Ear nose and throat-external ears and nose normal.    Respiratory-normal chest on inspection.  No respiratory distress noted.  Skin-no rashes.  Neuro-alert and oriented x3      Result Review :   The following data was reviewed by: Homer Bean MD on 05/17/2021:  Lab on 05/06/2021   Component Date Value Ref Range Status   • Glucose 05/06/2021 79  65 - 99 mg/dL Final   • BUN 05/06/2021 10  6 - 20 mg/dL Final   • Creatinine 05/06/2021 0.74  0.57 - 1.00 mg/dL Final   • eGFR Non  Am 05/06/2021 98  >60 mL/min/1.73 Final    Comment: GFR Normal >60  Chronic Kidney Disease <60  Kidney Failure <15     • eGFR  Am 05/06/2021 119  >60 mL/min/1.73 Final   • BUN/Creatinine Ratio 05/06/2021 13.5  7.0 - 25.0 Final   • Sodium 05/06/2021 137  136 - 145 mmol/L Final   • Potassium 05/06/2021 4.7  3.5 - 5.2 mmol/L Final   • Chloride 05/06/2021 103  " 98 - 107 mmol/L Final   • Total CO2 05/06/2021 23.4  22.0 - 29.0 mmol/L Final   • Calcium 05/06/2021 9.8  8.6 - 10.5 mg/dL Final   • Testosterone, Total 05/06/2021 53* 8 - 48 ng/dL Final    Comment:      **Effective May 10, 2021 Testosterone, Serum**         reference interval will be changing to:               Age                Male          Female            0 - 30 days          0 - 650        4 - 190            1 -  5 months        0 - 650        0 -  42                 6 months        0 -  36        0 -  42            7m-  1 year          0 -  36        1 -  26            2 -  5 years         0 -  36        3 -  33            6 -  8 years         0 -  36        3 -  25            9 - 10 years         0 -  21        1 -  33                11 years         1 - 161        5 -  58                12 years         2 - 521        5 -  58           13 - 15 years        28 - 656       12 -  71           16 - 17 years       150 - 785       12 -  71           18 - 19 years       150 - 785       13 -  71           20 - 30 years       264 - 916       13 -  71           31 - 40 years       264 - 916        8 -  60           41 - 60 years       264 - 916        4 -  50           61 - 80 yea                           rs       264 - 916        3 -  67               >80 years       264 - 916        2 -  45     • Testosterone, Free 05/06/2021 2.4  0.0 - 4.2 pg/mL Final   • Sex Hormone Binding Globulin 05/06/2021 15.3* 24.6 - 122.0 nmol/L Final   • Free T4 05/06/2021 1.41  0.93 - 1.70 ng/dL Final    Results may be falsely increased if patient taking Biotin.   • TSH 05/06/2021 3.350  0.270 - 4.200 uIU/mL Final     Data reviewed: PCP documentation        I reviewed the patient's new clinical results and mentioned them above in HPI and in plan as well.          Assessment and Plan    Problem List Items Addressed This Visit        Other    Morbid obesity (CMS/HCC)    Relevant Orders    TSH    T4, Free    Hemoglobin A1c    Basic  Metabolic Panel    Vitamin D 25 Hydroxy    Vitamin B12 & Folate    PCOS (polycystic ovarian syndrome)    Relevant Orders    TSH    T4, Free    Hemoglobin A1c    Basic Metabolic Panel    Vitamin D 25 Hydroxy    Vitamin B12 & Folate    Hypothyroidism - Primary    Relevant Orders    TSH    T4, Free    Hemoglobin A1c    Basic Metabolic Panel    Vitamin D 25 Hydroxy    Vitamin B12 & Folate    Prediabetes    Relevant Orders    TSH    T4, Free    Hemoglobin A1c    Basic Metabolic Panel    Vitamin D 25 Hydroxy    Vitamin B12 & Folate        Hypothyroidism  TSH is greater than 2.5, to help with the pregnancy increase the dosage to levothyroxine 112 mcg oral daily.    Prediabetes  Continue Metformin 750 mg oral daily  Advised the patient to stop the medication immediately when she gets to know that she is pregnant.    PCOS  Hold off on the birth control pills as patient is planning pregnancy.  Advised the patient to discuss with the OB/GYN about ovulation induction agents.  Progesterone to help with the withdrawal bleeding and to prevent the uterine thickening.  Follow Up   No follow-ups on file.    Refills/Meds sent to pharmacy    Interpreted the blood work-up/imaging results performed by the primary care/consulting physician -    Patient was given instructions and counseling regarding her condition or for health maintenance advice. Please see specific information pulled into the AVS if appropriate.

## 2021-06-24 RX ORDER — SPIRONOLACTONE 100 MG/1
100 TABLET, FILM COATED ORAL DAILY
Qty: 30 TABLET | Refills: 5 | Status: SHIPPED | OUTPATIENT
Start: 2021-06-24 | End: 2022-01-24

## 2021-08-09 RX ORDER — ALLOPURINOL 100 MG/1
TABLET ORAL
Qty: 90 TABLET | Refills: 3 | OUTPATIENT
Start: 2021-08-09

## 2021-08-12 RX ORDER — ERGOCALCIFEROL 1.25 MG/1
CAPSULE ORAL
Qty: 26 CAPSULE | Refills: 1 | Status: SHIPPED | OUTPATIENT
Start: 2021-08-12 | End: 2022-03-08 | Stop reason: SDUPTHER

## 2021-11-19 RX ORDER — METFORMIN HYDROCHLORIDE 750 MG/1
750 TABLET, EXTENDED RELEASE ORAL
Qty: 30 TABLET | Refills: 11 | Status: SHIPPED | OUTPATIENT
Start: 2021-11-19 | End: 2022-11-16

## 2022-01-24 RX ORDER — SPIRONOLACTONE 100 MG/1
100 TABLET, FILM COATED ORAL DAILY
Qty: 30 TABLET | Refills: 5 | Status: SHIPPED | OUTPATIENT
Start: 2022-01-24 | End: 2022-02-07

## 2022-02-07 ENCOUNTER — OFFICE VISIT (OUTPATIENT)
Dept: ENDOCRINOLOGY | Age: 22
End: 2022-02-07

## 2022-02-07 VITALS — BODY MASS INDEX: 46.3 KG/M2 | OXYGEN SATURATION: 97 % | HEART RATE: 85 BPM | HEIGHT: 64 IN | WEIGHT: 271.2 LBS

## 2022-02-07 DIAGNOSIS — E66.01 MORBID OBESITY: ICD-10-CM

## 2022-02-07 DIAGNOSIS — E28.2 PCOS (POLYCYSTIC OVARIAN SYNDROME): ICD-10-CM

## 2022-02-07 DIAGNOSIS — R73.03 PREDIABETES: ICD-10-CM

## 2022-02-07 DIAGNOSIS — E03.9 ACQUIRED HYPOTHYROIDISM: Primary | ICD-10-CM

## 2022-02-07 PROCEDURE — 99214 OFFICE O/P EST MOD 30 MIN: CPT | Performed by: INTERNAL MEDICINE

## 2022-02-07 NOTE — PROGRESS NOTES
"Chief Complaint  Hypothyroidism (follow up)    Subjective            History of Present Illness  Flora Solis,21 y.o. is here as a F/u hypothyroidism and pre - dm    Hypothyroidism - on levothyroxine 112 mcg oral daily, takes on empty stomach. Regular with her medication. She was missing the medication the last few weeks as she was moving to another place.     PCOS - is still taking metformin 750 mg po daily. And is also on aldactone 100 mg po daily.   Stopped taking birth control pills as she wanted to get pregnant and is also taking OTC conception fertility pill in Nov 2021.   Pt does report that her cycles are still irregular but a bit better on the fertility pill.   She has been trying to get pregnant for about 3 years now and in 2018 she was pregnant had miscarriage in the first weeks of the pregnancy.       Reviewed primary care physician's/consulting physician documentation and lab results     I have reviewed the patient's allergies, medicines, past medical hx, family hx and social hx in detail.    Objective   Vital Signs:   Pulse 85   Ht 162.6 cm (64\")   Wt 123 kg (271 lb 3.2 oz)   SpO2 97%   BMI 46.55 kg/m²     Physical Exam   General appearance - no distress  Eyes- anicteric sclera  Ear nose and throat-external ears and nose normal.    Respiratory-normal chest on inspection.  No respiratory distress noted.  Skin-no rashes.  Neuro-alert and oriented x3            Result Review :   The following data was reviewed by: Homer Bean MD on 02/07/2022:  Results Encounter on 11/13/2021   Component Date Value Ref Range Status   • TSH 01/24/2022 1.700  0.450 - 4.500 uIU/mL Final   • Free T4 01/24/2022 1.36  0.82 - 1.77 ng/dL Final   • Hemoglobin A1C 01/24/2022 5.3  4.8 - 5.6 % Final    Comment:          Prediabetes: 5.7 - 6.4           Diabetes: >6.4           Glycemic control for adults with diabetes: <7.0     • Glucose 01/24/2022 79  65 - 99 mg/dL Final   • BUN 01/24/2022 9  6 - 20 mg/dL Final   • " Creatinine 01/24/2022 0.77  0.57 - 1.00 mg/dL Final   • eGFR Non  Am 01/24/2022 110  >59 mL/min/1.73 Final   • eGFR African Am 01/24/2022 127  >59 mL/min/1.73 Final    Comment: **In accordance with recommendations from the NKF-ASN Task force,**    [facility] is in the process of updating its eGFR calculation to the    2021 CKD-EPI creatinine equation that estimates kidney function    without a race variable.     • BUN/Creatinine Ratio 01/24/2022 12  9 - 23 Final   • Sodium 01/24/2022 138  134 - 144 mmol/L Final   • Potassium 01/24/2022 4.5  3.5 - 5.2 mmol/L Final   • Chloride 01/24/2022 102  96 - 106 mmol/L Final   • Total CO2 01/24/2022 23  20 - 29 mmol/L Final   • Calcium 01/24/2022 9.5  8.7 - 10.2 mg/dL Final   • 25 Hydroxy, Vitamin D 01/24/2022 72.4  30.0 - 100.0 ng/mL Final    Comment: Vitamin D deficiency has been defined by the [place] of  Medicine and an Endocrine Society practice guideline as a  level of serum 25-OH vitamin D less than 20 ng/mL (1,2).  The Endocrine Society went on to further define vitamin D  insufficiency as a level between 21 and 29 ng/mL (2).  1. IOM ([place] of Medicine). 2010. Dietary reference     intakes for calcium and D. [place]: The     National Academies Press.  2. Sancho MF, Yfn NC, Nena MEYER, et al.     Evaluation, treatment, and prevention of vitamin D     deficiency: an Endocrine Society clinical practice     guideline. JCEM. 2011 Jul; 96(7):1911-30.     • Vitamin B-12 01/24/2022 988  232 - 1,245 pg/mL Final   • Folate 01/24/2022 >20.0  >3.0 ng/mL Final    Comment: A serum folate concentration of less than 3.1 ng/mL is  considered to represent clinical deficiency.       Data reviewed: pcp notes        I reviewed the patient's new clinical results and mentioned them above in HPI and in plan as well.          Assessment and Plan    Problem List Items Addressed This Visit        Other    Morbid obesity (HCC)    Relevant Orders    TSH    T4, Free     T3, Free    Basic Metabolic Panel    Hemoglobin A1c    Lipid Panel    Vitamin D 25 Hydroxy    PCOS (polycystic ovarian syndrome)    Relevant Orders    TSH    T4, Free    T3, Free    Basic Metabolic Panel    Hemoglobin A1c    Lipid Panel    Vitamin D 25 Hydroxy    Ambulatory Referral to Obstetrics / Gynecology    Hypothyroidism - Primary    Relevant Orders    TSH    T4, Free    T3, Free    Basic Metabolic Panel    Hemoglobin A1c    Lipid Panel    Vitamin D 25 Hydroxy    Ambulatory Referral to Obstetrics / Gynecology    Prediabetes    Relevant Orders    TSH    T4, Free    T3, Free    Basic Metabolic Panel    Hemoglobin A1c    Lipid Panel    Vitamin D 25 Hydroxy    Ambulatory Referral to Obstetrics / Gynecology      Hypothyroidism- hashimotos  Continue levothyroxine 100 mcg oral daily    Vitamin D deficiency  Continue vitamin D replacement    PCOS  Continue Metformin  Since patient is planning to conceive.  Discontinue the spironolactone.  Next para discussed with the patient briefly the benefits of ovulation induction agents.  Refer the patient to OB/GYN to help with these medications.    Patient is extremely tearful for her inability to get pregnant, emphasized to her the importance of weight loss which would further promote healthy pregnancy.  Also briefly discussed with her the benefits of ovulation induction medications.    Patient knows to alert us immediately when she gets to know that she is pregnant.  Refrained her from using over-the-counter medications to help with pregnancy.    Follow Up   No follow-ups on file.    Refills/Meds sent to pharmacy    Interpreted the blood work-up/imaging results performed by the primary care/consulting physician -    Patient was given instructions and counseling regarding her condition or for health maintenance advice. Please see specific information pulled into the AVS if appropriate.

## 2022-03-07 ENCOUNTER — TELEPHONE (OUTPATIENT)
Dept: ENDOCRINOLOGY | Age: 22
End: 2022-03-07

## 2022-03-08 RX ORDER — ERGOCALCIFEROL 1.25 MG/1
50000 CAPSULE ORAL 2 TIMES WEEKLY
Qty: 26 CAPSULE | Refills: 1 | Status: SHIPPED | OUTPATIENT
Start: 2022-03-10

## 2022-04-12 ENCOUNTER — OFFICE VISIT (OUTPATIENT)
Dept: OBSTETRICS AND GYNECOLOGY | Facility: CLINIC | Age: 22
End: 2022-04-12

## 2022-04-12 ENCOUNTER — PATIENT ROUNDING (BHMG ONLY) (OUTPATIENT)
Dept: OBSTETRICS AND GYNECOLOGY | Facility: CLINIC | Age: 22
End: 2022-04-12

## 2022-04-12 VITALS
DIASTOLIC BLOOD PRESSURE: 76 MMHG | BODY MASS INDEX: 46.44 KG/M2 | SYSTOLIC BLOOD PRESSURE: 124 MMHG | HEIGHT: 64 IN | WEIGHT: 272 LBS

## 2022-04-12 DIAGNOSIS — Z13.89 SCREENING FOR GENITOURINARY CONDITION: ICD-10-CM

## 2022-04-12 DIAGNOSIS — Z31.9 INFERTILITY MANAGEMENT: Primary | ICD-10-CM

## 2022-04-12 LAB
B-HCG UR QL: NEGATIVE
BILIRUB BLD-MCNC: NEGATIVE MG/DL
CLARITY, POC: CLEAR
COLOR UR: YELLOW
EXPIRATION DATE: NORMAL
GLUCOSE UR STRIP-MCNC: NEGATIVE MG/DL
INTERNAL NEGATIVE CONTROL: NEGATIVE
INTERNAL POSITIVE CONTROL: POSITIVE
KETONES UR QL: NEGATIVE
LEUKOCYTE EST, POC: NEGATIVE
Lab: NORMAL
NITRITE UR-MCNC: NEGATIVE MG/ML
PH UR: 8 [PH] (ref 5–8)
PROT UR STRIP-MCNC: NEGATIVE MG/DL
RBC # UR STRIP: NEGATIVE /UL
SP GR UR: 1.03 (ref 1–1.03)
UROBILINOGEN UR QL: NORMAL

## 2022-04-12 PROCEDURE — 99213 OFFICE O/P EST LOW 20 MIN: CPT | Performed by: OBSTETRICS & GYNECOLOGY

## 2022-04-12 PROCEDURE — 81025 URINE PREGNANCY TEST: CPT | Performed by: OBSTETRICS & GYNECOLOGY

## 2022-04-12 RX ORDER — DESONIDE 0.5 MG/G
CREAM TOPICAL SEE ADMIN INSTRUCTIONS
COMMUNITY
Start: 2022-01-19 | End: 2023-01-10

## 2022-04-12 RX ORDER — FEXOFENADINE HCL 180 MG/1
180 TABLET ORAL DAILY
COMMUNITY
Start: 2021-11-16

## 2022-04-12 RX ORDER — FERROUS SULFATE 325(65) MG
1 TABLET ORAL
COMMUNITY
Start: 2022-02-16

## 2022-04-12 NOTE — PROGRESS NOTES
A MY-CHART MESSAGE HAS BEEN SENT TO THE PATIENT FOR PATIENT ROUNDING WITH [facility] [place]

## 2022-04-12 NOTE — PROGRESS NOTES
"PROBLEM VISIT    Chief Complaint: infertility      Flora Solis is a 22 y.o. patient who presents as a new patient to discuss infertility. Pt is . Pt had SAB very early in . Pt has been unable to get pregnant since that SAB. Pt states they have been actively trying to have sex. Pt reports that she has hx of normal cycles every month until 3/2020 and started with cycles every 2 weeks. She started on OCPs 2021 to regulate her cycle and she stopped it 2021. She had regular cycle for 2 months but then started with bleeding every 2 weeks again and her cycles would last for 2 weeks. She started an OTC fertility medication in November. She is now getting her cycle monthly and they are lasting 6-7 days. Medication : Pito-Inositol, Vitex, Folic acid pill. Pt is also on Metformin 750XL daily for prediabetes. Pt has tried to lose weight. She has always been overweight. She has gotten down to 250 in the past. She is . Pt saw an OB in past and was told she has PCOS. Her  has had a normal semen analysis 10/2021.      Chief Complaint   Patient presents with   • Infertility             The following portions of the patient's history were reviewed and updated as appropriate: allergies, current medications and problem list.    Review of Systems   Constitutional: Negative for appetite change, chills, fatigue, fever and unexpected weight change.   Gastrointestinal: Negative for abdominal distention, abdominal pain, anal bleeding, blood in stool, constipation, diarrhea, nausea and vomiting.   Genitourinary: Negative for dyspareunia, dysuria, menstrual problem, pelvic pain, vaginal bleeding, vaginal discharge and vaginal pain.       /76   Ht 162.6 cm (64\")   Wt 123 kg (272 lb)   LMP 2022   BMI 46.69 kg/m²     Physical Exam  Vitals reviewed.   Constitutional:       General: She is not in acute distress.     Appearance: Normal appearance. She is not ill-appearing, toxic-appearing or " diaphoretic.   Neurological:      General: No focal deficit present.      Mental Status: She is alert and oriented to person, place, and time. Mental status is at baseline.      Motor: No weakness.      Coordination: Coordination normal.      Gait: Gait normal.   Psychiatric:         Mood and Affect: Mood normal.         Behavior: Behavior normal.         Thought Content: Thought content normal.         Judgment: Judgment normal.           Assessment/Plan   Diagnoses and all orders for this visit:    1. Infertility management (Primary)  -     17-Hydroxyprogesterone  -     DHEA-Sulfate  -     Insulin, Total  -     Glucose, Plasma ([facility])  -     Testosterone, Free, Total  -     Progesterone    2. Screening for genitourinary condition  -     POC Urinalysis Dipstick  -     POC Pregnancy, Urine    22-year-old -0-1-0 with secondary infertility    1) secondary infertility: Check day 21 progesterone level.  Check PCOS labs.  Patient's  has had a normal semen analysis.  Patient has a history of irregular cycles but right now is cycling normal on an over-the-counter fertility medication she is taking.  Transvaginal ultrasound performed today reveals: Anteverted uterus with smooth borders.  Endometrial lining 7 mm thick.Normal ovaries bilaterally. No free fluid noted.  Patient will follow-up once labs have returned.  Patient may need Clomid or Femara for ovulation induction.    2) Gyn HM: LPS 2020.               No follow-ups on file.      Priscilla Gavin DO    2022  14:10 EDT

## 2022-04-25 LAB
17OHP SERPL-MCNC: 13 NG/DL
DHEA-S SERPL-MCNC: 433 UG/DL (ref 110–431.7)
GLUCOSE P FAST SERPL-MCNC: 75 MG/DL (ref 65–99)
INSULIN SERPL-ACNC: 17.4 UIU/ML (ref 2.6–24.9)
PROGEST SERPL-MCNC: 0.2 NG/ML
TESTOST FREE SERPL-MCNC: 3 PG/ML (ref 0–4.2)
TESTOST SERPL-MCNC: 59 NG/DL (ref 13–71)

## 2022-05-17 ENCOUNTER — OFFICE VISIT (OUTPATIENT)
Dept: OBSTETRICS AND GYNECOLOGY | Facility: CLINIC | Age: 22
End: 2022-05-17

## 2022-05-17 VITALS
SYSTOLIC BLOOD PRESSURE: 138 MMHG | DIASTOLIC BLOOD PRESSURE: 78 MMHG | BODY MASS INDEX: 46.81 KG/M2 | WEIGHT: 274.2 LBS | HEIGHT: 64 IN

## 2022-05-17 DIAGNOSIS — E28.2 PCOS (POLYCYSTIC OVARIAN SYNDROME): Primary | ICD-10-CM

## 2022-05-17 DIAGNOSIS — N97.0 INFERTILITY, ANOVULATION: ICD-10-CM

## 2022-05-17 DIAGNOSIS — N92.6 IRREGULAR MENSES: ICD-10-CM

## 2022-05-17 PROCEDURE — 99213 OFFICE O/P EST LOW 20 MIN: CPT | Performed by: OBSTETRICS & GYNECOLOGY

## 2022-05-17 RX ORDER — LETROZOLE 2.5 MG/1
2.5 TABLET, FILM COATED ORAL DAILY
Qty: 5 TABLET | Refills: 2 | Status: SHIPPED | OUTPATIENT
Start: 2022-05-17 | End: 2022-05-22

## 2022-05-17 NOTE — PROGRESS NOTES
"PROBLEM VISIT    Chief Complaint: secondary infertility      Flora Solis is a 22 y.o. patient who presents for follow up of secondary infertility.  Patient reports that she has stopped all of her medications.  She is not on her Synthroid or metformin anymore.  Patient reports that she is getting her menstrual cycle every month on her own.  Patient is interested in starting ovulation medications as she has been diagnosed with anovulation.  Her  has had a normal semen analysis.  Chief Complaint   Patient presents with   • Follow-up             The following portions of the patient's history were reviewed and updated as appropriate: allergies, current medications and problem list.    Review of Systems   Constitutional: Negative for appetite change, chills, fatigue, fever and unexpected weight change.   Gastrointestinal: Negative for abdominal distention, abdominal pain, anal bleeding, blood in stool, constipation, diarrhea, nausea and vomiting.   Genitourinary: Negative for dyspareunia, dysuria, menstrual problem, pelvic pain, vaginal bleeding, vaginal discharge and vaginal pain.       /78   Ht 162.6 cm (64\")   Wt 124 kg (274 lb 3.2 oz)   LMP 04/03/2022 (Exact Date)   BMI 47.07 kg/m²     Physical Exam  Vitals reviewed.   Constitutional:       General: She is not in acute distress.     Appearance: She is obese. She is not ill-appearing, toxic-appearing or diaphoretic.   Neurological:      General: No focal deficit present.      Mental Status: She is alert and oriented to person, place, and time. Mental status is at baseline.      Motor: No weakness.      Gait: Gait normal.   Psychiatric:         Mood and Affect: Mood normal.         Behavior: Behavior normal.         Thought Content: Thought content normal.         Judgment: Judgment normal.           Assessment & Plan   Diagnoses and all orders for this visit:    1. PCOS (polycystic ovarian syndrome) (Primary)    2. Irregular menses    3. " Infertility, anovulation  -     Progesterone; Future    Other orders  -     letrozole (Femara) 2.5 MG tablet; Take 1 tablet by mouth Daily for 5 days.  Dispense: 5 tablet; Refill: 2      22-year-old -0-1-0 with secondary infertility    1) secondary infertility: Day 21 progesterone reveals she is not ovulating.  PCOS labs reveal mildly elevated DHEAS.  Patient's  has had a normal semen analysis.  Transvaginal ultrasound revealed: Anteverted uterus with smooth borders.  Endometrial lining 7 mm thick.Normal ovaries bilaterally. No free fluid noted.  Patient is interested in starting fertility treatment now.  Patient instructed to be on prenatal vitamins.  We will start Femara 2.5 mg p.o. daily for the next 5 days.  Patient to follow-up for day 21 progesterone level after starting tomorrow.    2) Gyn HM: LPS 2021     3) PCOS: Pt states she stopped Metformin.  Patient's insulin is normal but her DHEA is elevated.  Discussed with patient that she should restart her metformin to help with ovulation.    Return in about 3 months (around 2022) for Annual physical.      Priscilla Gavin DO    2022  10:34 EDT

## 2022-05-31 RX ORDER — LEVOTHYROXINE SODIUM 112 UG/1
112 TABLET ORAL DAILY
Qty: 30 TABLET | Refills: 11 | Status: SHIPPED | OUTPATIENT
Start: 2022-05-31 | End: 2022-12-23

## 2022-06-24 ENCOUNTER — TELEPHONE (OUTPATIENT)
Dept: OBSTETRICS AND GYNECOLOGY | Facility: CLINIC | Age: 22
End: 2022-06-24

## 2022-07-13 RX ORDER — LETROZOLE 2.5 MG/1
5 TABLET, FILM COATED ORAL DAILY
Qty: 10 TABLET | Refills: 2 | Status: SHIPPED | OUTPATIENT
Start: 2022-07-13 | End: 2022-07-18

## 2022-08-01 ENCOUNTER — TELEPHONE (OUTPATIENT)
Dept: OBSTETRICS AND GYNECOLOGY | Facility: CLINIC | Age: 22
End: 2022-08-01

## 2022-08-01 NOTE — TELEPHONE ENCOUNTER
Caller: Flora Solis    Relationship to patient: Self    Best call back number: [phone]    Patient is needing: PT CALLED IN AS SHE BELIEVES SHE HAS A POSSIBLE YEAST INFECTION. PT TOOK FEMARA ON DAY 3 OF HER CYCLE HOWEVER SHE JUST FINISHED HER CYCLE AND BELIEVES SHE HAS A YEAST INFECTION. PT IS NOT SURE IF SHE SHOULD STILL FOLLOW INSTRUCTION OF HAVING INTERCOURSE EVERY DAY OR IF SHE SHOULD TAKE SOMETHING FOR THE INFECTION AND NOT GO THROUGH WITH THE PLAN. PT IS AVAILABLE ANYTIME FOR A CALL BACK AND A DETAILED MESSAGE CAN BE LEFT IF SHE IS UNABLE TO ANSWER.

## 2022-08-02 RX ORDER — FLUCONAZOLE 150 MG/1
150 TABLET ORAL DAILY
Qty: 1 TABLET | Refills: 0 | Status: SHIPPED | OUTPATIENT
Start: 2022-08-02 | End: 2023-01-10

## 2022-08-18 ENCOUNTER — OFFICE VISIT (OUTPATIENT)
Dept: OBSTETRICS AND GYNECOLOGY | Facility: CLINIC | Age: 22
End: 2022-08-18

## 2022-08-18 VITALS
HEIGHT: 64 IN | BODY MASS INDEX: 49.1 KG/M2 | WEIGHT: 287.6 LBS | DIASTOLIC BLOOD PRESSURE: 68 MMHG | SYSTOLIC BLOOD PRESSURE: 140 MMHG

## 2022-08-18 DIAGNOSIS — E28.2 PCOS (POLYCYSTIC OVARIAN SYNDROME): ICD-10-CM

## 2022-08-18 DIAGNOSIS — Z01.419 PAP SMEAR, LOW-RISK: ICD-10-CM

## 2022-08-18 DIAGNOSIS — R63.8 INCREASED BMI: ICD-10-CM

## 2022-08-18 DIAGNOSIS — Z01.419 ROUTINE GYNECOLOGICAL EXAMINATION: Primary | ICD-10-CM

## 2022-08-18 DIAGNOSIS — Z11.51 ENCOUNTER FOR SCREENING FOR HUMAN PAPILLOMAVIRUS (HPV): ICD-10-CM

## 2022-08-18 LAB
B-HCG UR QL: NEGATIVE
BILIRUB BLD-MCNC: NEGATIVE MG/DL
CLARITY, POC: CLEAR
COLOR UR: YELLOW
EXPIRATION DATE: NORMAL
GLUCOSE UR STRIP-MCNC: NEGATIVE MG/DL
INTERNAL NEGATIVE CONTROL: NEGATIVE
INTERNAL POSITIVE CONTROL: POSITIVE
KETONES UR QL: NEGATIVE
LEUKOCYTE EST, POC: NEGATIVE
Lab: 55
NITRITE UR-MCNC: NEGATIVE MG/ML
PH UR: 5 [PH] (ref 5–8)
PROT UR STRIP-MCNC: NEGATIVE MG/DL
RBC # UR STRIP: NEGATIVE /UL
SP GR UR: 1 (ref 1–1.03)
UROBILINOGEN UR QL: NORMAL

## 2022-08-18 PROCEDURE — 2014F MENTAL STATUS ASSESS: CPT | Performed by: OBSTETRICS & GYNECOLOGY

## 2022-08-18 PROCEDURE — 99395 PREV VISIT EST AGE 18-39: CPT | Performed by: OBSTETRICS & GYNECOLOGY

## 2022-08-18 PROCEDURE — 81025 URINE PREGNANCY TEST: CPT | Performed by: OBSTETRICS & GYNECOLOGY

## 2022-08-18 PROCEDURE — 3008F BODY MASS INDEX DOCD: CPT | Performed by: OBSTETRICS & GYNECOLOGY

## 2022-08-18 RX ORDER — LETROZOLE 2.5 MG/1
TABLET, FILM COATED ORAL
COMMUNITY
Start: 2022-08-14 | End: 2023-01-10

## 2022-08-18 RX ORDER — PNV NO.95/FERROUS FUM/FOLIC AC 28MG-0.8MG
1 TABLET ORAL DAILY
Qty: 90 TABLET | Refills: 3 | Status: SHIPPED | OUTPATIENT
Start: 2022-08-18

## 2022-08-18 NOTE — PROGRESS NOTES
GYN Annual Exam     CC- Here for annual exam.     Flora Solis is a 22 y.o. female who presents for annual well woman exam. Periods are regular every 28-30 days, lasting 5 days. Dysmenorrhea:none. Cyclic symptoms include none. No intermenstrual bleeding, spotting, or discharge.  Patient is sexually active  yes -  . Patient is satisfied with her contraception yes - none.  Patient has been trying to get pregnant.  She is taken Femara 2.5 mg x 5 days with no evidence of ovulation.  Patient was prescribed Femara 5 mg but has not done a full cycle yet.  Patient reports she is been under a lot of stress lately and has noted that she is gained approximately 15 pounds in last 3 months.    OB History        1    Para        Term                AB   1    Living           SAB        IAB        Ectopic        Molar        Multiple        Live Births                    Current contraception: none  History of abnormal Pap smear: no  History of abnormal mammogram: no  Family history of uterine, colon or ovarian cancer: no  Family history of breast cancer: no    Health Maintenance   Topic Date Due   • COVID-19 Vaccine (1) Never done   • ANNUAL PHYSICAL  Never done   • HPV VACCINES (1 - 2-dose series) Never done   • HEPATITIS C SCREENING  Never done   • Annual Gynecologic Pelvic and Breast Exam  2019   • CHLAMYDIA SCREENING  2020   • INFLUENZA VACCINE  10/01/2022   • PAP SMEAR  2023   • TDAP/TD VACCINES (3 - Td or Tdap) 2030   • Pneumococcal Vaccine 0-64  Aged Out       Past Medical History:   Diagnosis Date   • Hypothyroidism    • Migraine    • Polycystic ovary syndrome    • Pre-diabetes    • Vitamin D deficiency    • Vitamin D deficiency        Past Surgical History:   Procedure Laterality Date   • INNER EAR SURGERY     • WISDOM TOOTH EXTRACTION           Current Outpatient Medications:   •  desonide (DESOWEN) 0.05 % cream, Apply  topically to the appropriate area as directed See  Admin Instructions. Apply topically to the affected area twice daily, Disp: , Rfl:   •  famotidine (PEPCID) 20 MG tablet, Take 20 mg by mouth 2 (Two) Times a Day As Needed., Disp: , Rfl:   •  FeroSul 325 (65 Fe) MG tablet, Take 1 tablet by mouth Daily With Breakfast., Disp: , Rfl:   •  fexofenadine (ALLEGRA) 180 MG tablet, Take 180 mg by mouth Daily., Disp: , Rfl:   •  fluconazole (Diflucan) 150 MG tablet, Take 1 tablet by mouth Daily., Disp: 1 tablet, Rfl: 0  •  ibuprofen (ADVIL,MOTRIN) 800 MG tablet, Take 1 tablet by mouth Every 8 (Eight) Hours As Needed for Moderate Pain ., Disp: 30 tablet, Rfl: 0  •  letrozole (FEMARA) 2.5 MG tablet, TAKE 2 TABLETS BY MOUTH DAILY FOR 5 DAYS, Disp: , Rfl:   •  levothyroxine (SYNTHROID, LEVOTHROID) 112 MCG tablet, TAKE 1 TABLET BY MOUTH DAILY, Disp: 30 tablet, Rfl: 11  •  metFORMIN ER (GLUCOPHAGE-XR) 750 MG 24 hr tablet, TAKE 1 TABLET BY MOUTH DAILY WITH BREAKFAST, Disp: 30 tablet, Rfl: 11  •  Prenatal Vit-Fe Fumarate-FA (Prenatal Vitamin) 27-0.8 MG tablet, Take 1 tablet by mouth Daily., Disp: 90 tablet, Rfl: 3  •  vitamin D (ERGOCALCIFEROL) 1.25 MG (72607 UT) capsule capsule, Take 1 capsule by mouth 2 (Two) Times a Week., Disp: 26 capsule, Rfl: 1    Allergies   Allergen Reactions   • Cephalexin Hives       Social History     Tobacco Use   • Smoking status: Never Smoker   • Smokeless tobacco: Never Used   Substance Use Topics   • Alcohol use: Yes     Comment: occas   • Drug use: No       Family History   Problem Relation Age of Onset   • Asthma Father    • Hypertension Mother    • Diabetes Mother    • Hypertension Maternal Grandmother    • Hypertension Maternal Grandfather    • Breast cancer Neg Hx    • Ovarian cancer Neg Hx    • Uterine cancer Neg Hx    • Colon cancer Neg Hx        Review of Systems   Constitutional: Negative for appetite change, chills, fatigue, fever and unexpected weight change.   Gastrointestinal: Negative for abdominal distention, abdominal pain, anal  "bleeding, blood in stool, constipation, diarrhea, nausea and vomiting.   Genitourinary: Negative for dyspareunia, dysuria, menstrual problem, pelvic pain, vaginal bleeding, vaginal discharge and vaginal pain.       /68   Ht 162.6 cm (64.02\")   Wt 130 kg (287 lb 9.6 oz)   LMP 07/22/2022 (Exact Date)   Breastfeeding No   BMI 49.34 kg/m²     Physical Exam  Vitals reviewed.   Constitutional:       General: She is not in acute distress.     Appearance: Normal appearance. She is well-developed. She is not ill-appearing, toxic-appearing or diaphoretic.   HENT:      Mouth/Throat:      Dentition: Normal dentition. No dental caries.   Cardiovascular:      Rate and Rhythm: Normal rate and regular rhythm.      Heart sounds: Normal heart sounds.   Pulmonary:      Effort: Pulmonary effort is normal. No respiratory distress.      Breath sounds: Normal breath sounds. No stridor. No wheezing.   Chest:   Breasts:      Right: No inverted nipple, mass, nipple discharge, skin change or tenderness.      Left: No inverted nipple, mass, nipple discharge, skin change or tenderness.       Abdominal:      General: There is no distension.      Palpations: Abdomen is soft. There is no mass.      Tenderness: There is no abdominal tenderness.   Genitourinary:     General: Normal vulva.      Labia:         Right: No rash, tenderness or lesion.         Left: No rash, tenderness or lesion.       Urethra: No prolapse, urethral pain, urethral swelling or urethral lesion.      Vagina: No vaginal discharge, tenderness or bleeding.      Cervix: No cervical motion tenderness, discharge or friability.      Uterus: Not deviated, not enlarged, not fixed and not tender.       Adnexa:         Right: No mass, tenderness or fullness.          Left: No mass, tenderness or fullness.        Rectum: No tenderness or external hemorrhoid.   Musculoskeletal:         General: No tenderness. Normal range of motion.   Skin:     General: Skin is warm.      " Findings: No erythema or rash.   Neurological:      General: No focal deficit present.      Mental Status: She is alert and oriented to person, place, and time. Mental status is at baseline.      Cranial Nerves: No cranial nerve deficit.      Motor: No weakness.      Coordination: Coordination normal.      Gait: Gait normal.   Psychiatric:         Mood and Affect: Mood normal.         Behavior: Behavior normal.         Thought Content: Thought content normal.         Judgment: Judgment normal.            Assessment/Plan    1) GYN HM: Check pap smear.   SBE demonstrated and encouraged.  2) STD screening: check GCCT  3) Contraception: declines  4) secondary infertility: Day 21 progesterone revealed she is not ovulating.  PCOS labs revealed mildly elevated DHEAS.  Patient's  has had a normal semen analysis.  Transvaginal ultrasound revealed: Anteverted uterus with smooth borders.  Endometrial lining 7 mm thick.Normal ovaries bilaterally. No free fluid noted.  Pt took Femara 2.5mg x 5 days with day 21 progesterone 3.4. She was increased to 5mg Femara but has not done a cycle yet.  We will wait to continue with ovulation induction until patient starts to lose some weight and is closer to 250 pounds.  Patient agrees with plan.  5) Diet and Exercise discussed: Long discussion with patient about her weight gained.  Patient is at increased risk for diabetes and high blood pressure.  Her blood pressure is elevated today.  Discussed with patient that she needs to stop drinking sodas and that we need to increase her protein and decrease her carbohydrates.  Patient is going to follow-up in 3 months for weight check.  Goal of 10-15 pounds weight loss in the next 3 months.  Patient teary-eyed through interview.    6) Smoking Status: non smoker  7) PCOS: Pt states she stopped Metformin.  Patient's insulin is normal but her DHEA is elevated.  Pt is on Metformin XR 750mg daily.   8) Follow up prn and 1 year       Diagnoses and  all orders for this visit:    Routine gynecological examination  -     POC Urinalysis Dipstick  -     POC Pregnancy, Urine    Pap smear, low-risk  -     IGP,CtNgTv,rfx Aptima HPV ASCU    Encounter for screening for human papillomavirus (HPV)  -     IGP,CtNgTv,rfx Aptima HPV ASCU    PCOS (polycystic ovarian syndrome)    Increased BMI    Other orders  -     letrozole (FEMARA) 2.5 MG tablet; TAKE 2 TABLETS BY MOUTH DAILY FOR 5 DAYS  -     Prenatal Vit-Fe Fumarate-FA (Prenatal Vitamin) 27-0.8 MG tablet; Take 1 tablet by mouth Daily.          Priscilla Gavin DO  8/18/2022  12:02 EDT

## 2022-08-24 LAB
C TRACH RRNA CVX QL NAA+PROBE: NEGATIVE
CONV .: NORMAL
CYTOLOGIST CVX/VAG CYTO: NORMAL
CYTOLOGY CVX/VAG DOC CYTO: NORMAL
CYTOLOGY CVX/VAG DOC THIN PREP: NORMAL
DX ICD CODE: NORMAL
HIV 1 & 2 AB SER-IMP: NORMAL
Lab: NORMAL
N GONORRHOEA RRNA CVX QL NAA+PROBE: NEGATIVE
OTHER STN SPEC: NORMAL
STAT OF ADQ CVX/VAG CYTO-IMP: NORMAL
T VAGINALIS RRNA SPEC QL NAA+PROBE: NEGATIVE

## 2022-11-08 ENCOUNTER — OFFICE VISIT (OUTPATIENT)
Dept: ENDOCRINOLOGY | Age: 22
End: 2022-11-08

## 2022-11-08 VITALS
SYSTOLIC BLOOD PRESSURE: 118 MMHG | HEART RATE: 74 BPM | TEMPERATURE: 97.1 F | BODY MASS INDEX: 49.92 KG/M2 | DIASTOLIC BLOOD PRESSURE: 70 MMHG | HEIGHT: 64 IN | WEIGHT: 292.4 LBS | OXYGEN SATURATION: 100 %

## 2022-11-08 DIAGNOSIS — E66.01 MORBID OBESITY: ICD-10-CM

## 2022-11-08 DIAGNOSIS — R73.03 PREDIABETES: ICD-10-CM

## 2022-11-08 DIAGNOSIS — E03.9 ACQUIRED HYPOTHYROIDISM: Primary | ICD-10-CM

## 2022-11-08 PROCEDURE — 99214 OFFICE O/P EST MOD 30 MIN: CPT | Performed by: INTERNAL MEDICINE

## 2022-11-08 NOTE — PROGRESS NOTES
"Chief Complaint  Hypothyroidism (Patient states that her energy levels have been so so, she has family hx of thyroid disease (grandmother) her weight is higher than expected and she doesn't have regular regular menstrual cycles )    Subjective            History of Present Illness  Flora Solis,22 y.o. is here as a F/u hypothyroidism and pre - dm    # Pt recently lost her dad and during that time she missed taking her medications.     Hypothyroidism - on levothyroxine 112 mcg oral daily, takes on empty stomach. Regular with her medication. She was missing the medication the last few weeks as she was moving to another place.      PCOS - is still taking metformin 750 mg po daily.   Stopped taking aldactone as she wanted to get pregnant.   Stopped taking birth control pills as she wanted to get pregnant, she is on femera through her GYN. She was also instructed to lose some weight prior to considering pregnancy.   Pt does report that her cycles are still irregular.   Last cycle - 9/2022 - last week to beginning of this oct.   She had a miscarriage in 2018    Reviewed primary care physician's/consulting physician documentation and lab results     I have reviewed the patient's allergies, medicines, past medical hx, family hx and social hx in detail.    Objective   Vital Signs:   /70   Pulse 74   Temp 97.1 °F (36.2 °C) (Temporal)   Ht 162.6 cm (64\")   Wt 133 kg (292 lb 6.4 oz)   SpO2 100%   BMI 50.19 kg/m²     Physical Exam   General appearance - no distress  Eyes- anicteric sclera  Ear nose and throat-external ears and nose normal.    Respiratory-normal chest on inspection.  No respiratory distress noted.  Skin-no rashes.  Neuro-alert and oriented x3            Result Review :{ Labs  Result Review  Imaging  Med Tab  Media :23}   The following data was reviewed by: Homer Bean MD on 11/08/2022:  Office Visit on 08/18/2022   Component Date Value Ref Range Status   • Color 08/18/2022 Yellow  Yellow, " Straw, Dark Yellow, Jazz Final   • Clarity, UA 08/18/2022 Clear  Clear Final   • Glucose, UA 08/18/2022 Negative  Negative mg/dL Final   • Bilirubin 08/18/2022 Negative  Negative Final   • Ketones, UA 08/18/2022 Negative  Negative Final   • Specific Gravity  08/18/2022 1.005  1.005 - 1.030 Final   • Blood, UA 08/18/2022 Negative  Negative Final   • pH, Urine 08/18/2022 5.0  5.0 - 8.0 Final   • Protein, POC 08/18/2022 Negative  Negative mg/dL Final   • Urobilinogen, UA 08/18/2022 Normal  Normal Final   • Leukocytes 08/18/2022 Negative  Negative Final   • Nitrite, UA 08/18/2022 Negative  Negative Final   • HCG, Urine, QL 08/18/2022 Negative  Negative Final   • Lot Number 08/18/2022 55   Final   • Internal Positive Control 08/18/2022 Positive  Positive, Passed Final   • Internal Negative Control 08/18/2022 Negative  Negative, Passed Final   • Expiration Date 08/18/2022 5/28   Final   • Diagnosis 08/18/2022 Comment   Final    Comment: NEGATIVE FOR INTRAEPITHELIAL LESION OR MALIGNANCY.  THIS SPECIMEN WAS RESCREENED AS PART OF OUR  PROGRAM.     • Specimen adequacy: 08/18/2022 Comment   Final    Satisfactory for evaluation. No endocervical component is identified.   • Clinician Provided ICD-10: 08/18/2022 Comment   Final    Comment: Z01.419  Z11.51     • Performed by: 08/18/2022 Comment   Final    Carolyne Amaya Cytotechnologist ([facility])   • QC reviewed by: 08/18/2022 Comment   Final    Trish Desir Cytotechnologbry   • . 08/18/2022 .   Final   • Note: 08/18/2022 Comment   Final    Comment: The Pap smear is a screening test designed to aid in the detection of  premalignant and malignant conditions of the uterine cervix.  It is not a  diagnostic procedure and should not be used as the sole means of detecting  cervical cancer.  Both false-positive and false-negative reports do occur.     • Method: 08/18/2022 Comment   Final    Comment: This liquid based ThinPrep(R) pap test was screened with the  use of  an image guided system.     • Conv .conv 08/18/2022 Comment   Final    Comment: The HPV DNA reflex criteria were not met with this specimen result  therefore, no HPV testing was performed.     • Chlamydia, Nuc. Acid Amp 08/18/2022 Negative  Negative Final   • Gonococcus by Nucleic Acid Amp 08/18/2022 Negative  Negative Final   • Trichomonas vaginosis 08/18/2022 Negative  Negative Final     Data reviewed: PCP and endocrine notes        I reviewed the patient's new clinical results and mentioned them above in HPI and in plan as well.          Assessment and Plan    Problem List Items Addressed This Visit        Other    Morbid obesity (HCC)    Relevant Orders    T3, Free    T4, Free    TSH    T3, Free    T4, Free    TSH    Basic Metabolic Panel    Hemoglobin A1c    Lipid Panel    Vitamin B12 & Folate    Vitamin D,25-Hydroxy    Hypothyroidism - Primary    Relevant Orders    T3, Free    T4, Free    TSH    T3, Free    T4, Free    TSH    Basic Metabolic Panel    Hemoglobin A1c    Lipid Panel    Vitamin B12 & Folate    Vitamin D,25-Hydroxy    Prediabetes    Relevant Orders    T3, Free    T4, Free    TSH    T3, Free    T4, Free    TSH    Basic Metabolic Panel    Hemoglobin A1c    Lipid Panel    Vitamin B12 & Folate    Vitamin D,25-Hydroxy     Hypothyroidism  Patient was noncompliant with her thyroid medication  Check blood work-up in 6 weeks from now  Goal TSH should be less than 2.5 as patient is planning to get pregnant.    Prediabetes  Continue metformin.    Emphasized the patient to lose some weight as it is extremely critical to have a successful pregnancy.    Follow Up   No follow-ups on file.    Refills/Meds sent to pharmacy    Interpreted the blood work-up/imaging results performed by the primary care/consulting physician -    Patient was given instructions and counseling regarding her condition or for health maintenance advice. Please see specific information pulled into the AVS if appropriate.

## 2022-11-16 RX ORDER — METFORMIN HYDROCHLORIDE 500 MG/1
TABLET, EXTENDED RELEASE ORAL
Qty: 360 TABLET | Refills: 2 | Status: SHIPPED | OUTPATIENT
Start: 2022-11-16

## 2022-12-20 ENCOUNTER — LAB (OUTPATIENT)
Dept: ENDOCRINOLOGY | Age: 22
End: 2022-12-20

## 2022-12-20 DIAGNOSIS — R73.03 PREDIABETES: ICD-10-CM

## 2022-12-20 DIAGNOSIS — E66.01 MORBID OBESITY: ICD-10-CM

## 2022-12-20 DIAGNOSIS — E03.9 ACQUIRED HYPOTHYROIDISM: ICD-10-CM

## 2022-12-21 LAB
25(OH)D3+25(OH)D2 SERPL-MCNC: 52.9 NG/ML (ref 30–100)
BUN SERPL-MCNC: 10 MG/DL (ref 6–20)
BUN/CREAT SERPL: 16.7 (ref 7–25)
CALCIUM SERPL-MCNC: 9.3 MG/DL (ref 8.6–10.5)
CHLORIDE SERPL-SCNC: 104 MMOL/L (ref 98–107)
CHOLEST SERPL-MCNC: 170 MG/DL (ref 0–200)
CO2 SERPL-SCNC: 24.8 MMOL/L (ref 22–29)
CREAT SERPL-MCNC: 0.6 MG/DL (ref 0.57–1)
EGFRCR SERPLBLD CKD-EPI 2021: 129.5 ML/MIN/1.73
FOLATE SERPL-MCNC: >20 NG/ML (ref 4.78–24.2)
GLUCOSE SERPL-MCNC: 85 MG/DL (ref 65–99)
HBA1C MFR BLD: 5.5 % (ref 4.8–5.6)
HDLC SERPL-MCNC: 41 MG/DL (ref 40–60)
IMP & REVIEW OF LAB RESULTS: NORMAL
LDLC SERPL CALC-MCNC: 106 MG/DL (ref 0–100)
POTASSIUM SERPL-SCNC: 4.5 MMOL/L (ref 3.5–5.2)
SODIUM SERPL-SCNC: 138 MMOL/L (ref 136–145)
T3FREE SERPL-MCNC: 3 PG/ML (ref 2–4.4)
T4 FREE SERPL-MCNC: 1.07 NG/DL (ref 0.93–1.7)
TRIGL SERPL-MCNC: 127 MG/DL (ref 0–150)
TSH SERPL DL<=0.005 MIU/L-ACNC: 2.95 UIU/ML (ref 0.27–4.2)
VIT B12 SERPL-MCNC: 738 PG/ML (ref 211–946)
VLDLC SERPL CALC-MCNC: 23 MG/DL (ref 5–40)

## 2022-12-23 RX ORDER — LEVOTHYROXINE SODIUM 0.12 MG/1
125 TABLET ORAL DAILY
Qty: 30 TABLET | Refills: 11 | Status: SHIPPED | OUTPATIENT
Start: 2022-12-23 | End: 2023-03-03 | Stop reason: SDUPTHER

## 2023-01-10 ENCOUNTER — OFFICE VISIT (OUTPATIENT)
Dept: OBSTETRICS AND GYNECOLOGY | Facility: CLINIC | Age: 23
End: 2023-01-10
Payer: COMMERCIAL

## 2023-01-10 VITALS
BODY MASS INDEX: 49.68 KG/M2 | SYSTOLIC BLOOD PRESSURE: 132 MMHG | HEIGHT: 64 IN | WEIGHT: 291 LBS | DIASTOLIC BLOOD PRESSURE: 70 MMHG

## 2023-01-10 DIAGNOSIS — E28.2 PCOS (POLYCYSTIC OVARIAN SYNDROME): ICD-10-CM

## 2023-01-10 DIAGNOSIS — N89.8 VAGINAL IRRITATION: Primary | ICD-10-CM

## 2023-01-10 DIAGNOSIS — Z13.89 SCREENING FOR GENITOURINARY CONDITION: ICD-10-CM

## 2023-01-10 DIAGNOSIS — N91.4 SECONDARY OLIGOMENORRHEA: ICD-10-CM

## 2023-01-10 PROCEDURE — 99213 OFFICE O/P EST LOW 20 MIN: CPT | Performed by: OBSTETRICS & GYNECOLOGY

## 2023-01-10 RX ORDER — MEDROXYPROGESTERONE ACETATE 10 MG/1
10 TABLET ORAL DAILY
Qty: 5 TABLET | Refills: 6 | Status: SHIPPED | OUTPATIENT
Start: 2023-01-10

## 2023-01-10 NOTE — PROGRESS NOTES
"PROBLEM VISIT    Chief Complaint: oligomenorrhea and vaginal irritation      Flora Solis is a 22 y.o. patient who presents complaining of urinary symptoms and oligomenorrhea. Pt reports she had UTI sx but she states the symptoms resolved after she had her cycle at the beginning of this month. She had not had a cycle since October. Pt has hx of PCOS. Pt reports this cycle was only brown spotting and not enough to fill a pad. Pt has been on Amoxicillin and wondering if she has a yeast infection.   Chief Complaint   Patient presents with   • Vaginal Discharge   • Urinary Frequency   • Abdominal Pain   • Menstrual Problem             The following portions of the patient's history were reviewed and updated as appropriate: allergies, current medications and problem list.    Review of Systems   Constitutional: Negative for appetite change, chills, fatigue, fever and unexpected weight change.   Gastrointestinal: Negative for abdominal distention, abdominal pain, anal bleeding, blood in stool, constipation, diarrhea, nausea and vomiting.   Genitourinary: Positive for menstrual problem and vaginal pain. Negative for dyspareunia, dysuria, pelvic pain, vaginal bleeding and vaginal discharge.       /70   Ht 162.6 cm (64\")   Wt 132 kg (291 lb)   LMP 01/01/2023   Breastfeeding No   BMI 49.95 kg/m²     Physical Exam  Vitals reviewed.   Constitutional:       General: She is not in acute distress.     Appearance: Normal appearance. She is well-developed. She is not ill-appearing, toxic-appearing or diaphoretic.   Genitourinary:     General: Normal vulva.      Labia:         Right: No rash, tenderness, lesion or injury.         Left: No rash, tenderness, lesion or injury.       Vagina: No signs of injury and foreign body. Vaginal discharge present. No erythema, tenderness or bleeding.      Cervix: No cervical motion tenderness, discharge or friability.   Skin:     General: Skin is warm.      Coloration: Skin is not " pale.      Findings: No erythema or rash.   Neurological:      General: No focal deficit present.      Mental Status: She is alert and oriented to person, place, and time. Mental status is at baseline.      Cranial Nerves: No cranial nerve deficit.      Motor: No weakness.      Coordination: Coordination normal.      Gait: Gait normal.   Psychiatric:         Mood and Affect: Mood normal.         Behavior: Behavior normal.         Thought Content: Thought content normal.         Judgment: Judgment normal.           Assessment & Plan   Diagnoses and all orders for this visit:    1. Vaginal irritation (Primary)  -     NuSwab VG+ - Swab, Vagina  -     Genital Mycoplasmas ARTEM, Swab - Swab, Vagina    2. Screening for genitourinary condition  -     POC Urinalysis Dipstick  -     POC Pregnancy, Urine    3. PCOS (polycystic ovarian syndrome)    4. Secondary oligomenorrhea    Other orders  -     medroxyPROGESTERone (Provera) 10 MG tablet; Take 1 tablet by mouth Daily.  Dispense: 5 tablet; Refill: 6    23yo with hx of PCOS, oligomenorrhea, vaginal irritation    1) PCOS: On Metformin. Last insulin normal 4/2022.    2) Oligomenorrhea: Instructed pt to take Provera 10 mg p.o. daily for 5 days to induce a cycle because it does not sound like she has had a cycle in several months.  I also instructed her to take the medication if she goes more than 35 days without a cycle and her urine pregnancy test is negative.    3) contraception: Patient declines.  Patient was interested in a baby and she is okay with pregnancy now.    4) infertility: Patient was actively trying for a baby with ovulation induction meds but has taken a break because she is dealing with some family issues at this time.               Return in about 7 months (around 8/10/2023) for Annual physical.      Priscilla Gavin DO    1/11/2023  12:53 EST

## 2023-01-14 LAB
A VAGINAE DNA VAG QL NAA+PROBE: NORMAL SCORE
BVAB2 DNA VAG QL NAA+PROBE: NORMAL SCORE
C ALBICANS DNA VAG QL NAA+PROBE: NEGATIVE
C GLABRATA DNA VAG QL NAA+PROBE: NEGATIVE
C TRACH DNA VAG QL NAA+PROBE: NEGATIVE
M GENITALIUM DNA SPEC QL NAA+PROBE: NEGATIVE
M HOMINIS DNA SPEC QL NAA+PROBE: NEGATIVE
MEGA1 DNA VAG QL NAA+PROBE: NORMAL SCORE
N GONORRHOEA DNA VAG QL NAA+PROBE: NEGATIVE
T VAGINALIS DNA VAG QL NAA+PROBE: NEGATIVE
UREAPLASMA DNA SPEC QL NAA+PROBE: POSITIVE

## 2023-01-15 RX ORDER — AZITHROMYCIN 500 MG/1
1000 TABLET, FILM COATED ORAL DAILY
Qty: 2 TABLET | Refills: 0 | Status: SHIPPED | OUTPATIENT
Start: 2023-01-15 | End: 2023-01-16

## 2023-02-20 NOTE — TELEPHONE ENCOUNTER
Rx Refill Note  Requested Prescriptions     Pending Prescriptions Disp Refills    vitamin D (ERGOCALCIFEROL) 1.25 MG (67631 UT) capsule capsule [Pharmacy Med Name: VITAMIN D2 50,000IU (ERGO) CAP RX] 26 capsule 1     Sig: TAKE 1 CAPSULE BY MOUTH TWICE WEEKLY      Last office visit with prescribing clinician: 11/8/2022   Last telemedicine visit with prescribing clinician: 4/24/2023   Next office visit with prescribing clinician: 11/9/2023                         Would you like a call back once the refill request has been completed: [] Yes [] No    If the office needs to give you a call back, can they leave a voicemail: [] Yes [] No    Rosemarie Peterson  02/20/23, 15:33 EST

## 2023-02-21 RX ORDER — ERGOCALCIFEROL 1.25 MG/1
CAPSULE ORAL
Qty: 26 CAPSULE | Refills: 1 | OUTPATIENT
Start: 2023-02-21

## 2023-03-02 NOTE — TELEPHONE ENCOUNTER
Pt  Called in and said that Dr. Bean had upped her Levothyroxine to 125mcg and the pharmacy never got that doseage so she has been taking the 112mcg. Pt is [email or web address]

## 2023-03-03 RX ORDER — LEVOTHYROXINE SODIUM 0.12 MG/1
125 TABLET ORAL DAILY
Qty: 30 TABLET | Refills: 3 | Status: SHIPPED | OUTPATIENT
Start: 2023-03-03 | End: 2024-03-02

## 2023-03-03 RX ORDER — ERGOCALCIFEROL 1.25 MG/1
CAPSULE ORAL
Qty: 26 CAPSULE | Refills: 1 | OUTPATIENT
Start: 2023-03-03

## 2023-03-03 NOTE — TELEPHONE ENCOUNTER
Rx Refill Note  Requested Prescriptions     Pending Prescriptions Disp Refills   • levothyroxine (SYNTHROID, LEVOTHROID) 125 MCG tablet 30 tablet 11     Sig: Take 1 tablet by mouth Daily.     Refused Prescriptions Disp Refills   • vitamin D (ERGOCALCIFEROL) 1.25 MG (43325 UT) capsule capsule [Pharmacy Med Name: VITAMIN D2 50,000IU (ERGO) CAP RX] 26 capsule 1     Sig: TAKE 1 CAPSULE BY MOUTH TWICE WEEKLY     Refused By: RIGO BELLA     Reason for Refusal: Refill not appropriate      Last office visit with prescribing clinician: 11/8/2022   Last telemedicine visit with prescribing clinician: 4/24/2023   Next office visit with prescribing clinician: Visit date not found

## 2023-03-06 RX ORDER — ERGOCALCIFEROL 1.25 MG/1
CAPSULE ORAL
Qty: 26 CAPSULE | Refills: 1 | OUTPATIENT
Start: 2023-03-06